# Patient Record
Sex: FEMALE | Race: WHITE | NOT HISPANIC OR LATINO | URBAN - METROPOLITAN AREA
[De-identification: names, ages, dates, MRNs, and addresses within clinical notes are randomized per-mention and may not be internally consistent; named-entity substitution may affect disease eponyms.]

---

## 2017-07-12 ENCOUNTER — TELEPHONE (OUTPATIENT)
Dept: CARDIOLOGY | Facility: CLINIC | Age: 57
End: 2017-07-12

## 2017-07-12 NOTE — TELEPHONE ENCOUNTER
----- Message from Angelita Cody sent at 7/12/2017  8:07 AM CDT -----  Contact: [phone]  Mr. Celaya with [facility] is requesting to have patient seen at the [facility] for hospital f/u chest pain

## 2017-09-11 ENCOUNTER — OFFICE VISIT (OUTPATIENT)
Dept: CARDIOLOGY | Facility: CLINIC | Age: 57
End: 2017-09-11
Payer: MEDICAID

## 2017-09-11 ENCOUNTER — HOSPITAL ENCOUNTER (INPATIENT)
Facility: HOSPITAL | Age: 57
LOS: 2 days | Discharge: HOME | DRG: 247 | End: 2017-09-13
Attending: INTERNAL MEDICINE | Admitting: INTERNAL MEDICINE
Payer: MEDICAID

## 2017-09-11 VITALS
HEART RATE: 71 BPM | OXYGEN SATURATION: 97 % | SYSTOLIC BLOOD PRESSURE: 210 MMHG | WEIGHT: 158.63 LBS | DIASTOLIC BLOOD PRESSURE: 117 MMHG

## 2017-09-11 DIAGNOSIS — Z72.0 TOBACCO ABUSE: ICD-10-CM

## 2017-09-11 DIAGNOSIS — I25.10 ATHEROSCLEROTIC HEART DISEASE OF NATIVE CORONARY ARTERY WITHOUT ANGINA PECTORIS: ICD-10-CM

## 2017-09-11 DIAGNOSIS — I20.0 UNSTABLE ANGINA: ICD-10-CM

## 2017-09-11 DIAGNOSIS — I16.0 MALIGNANT HYPERTENSIVE URGENCY: ICD-10-CM

## 2017-09-11 DIAGNOSIS — I16.0 MALIGNANT HYPERTENSIVE URGENCY: Primary | ICD-10-CM

## 2017-09-11 DIAGNOSIS — I25.10 ATHEROSCLEROSIS OF NATIVE CORONARY ARTERY OF NATIVE HEART WITHOUT ANGINA PECTORIS: ICD-10-CM

## 2017-09-11 LAB
ABO + RH BLD: NORMAL
BASOPHILS # BLD AUTO: 0.05 K/UL
BASOPHILS NFR BLD: 0.7 %
BLD GP AB SCN CELLS X3 SERPL QL: NORMAL
BNP SERPL-MCNC: 128 PG/ML
DIFFERENTIAL METHOD: NORMAL
EOSINOPHIL # BLD AUTO: 0.1 K/UL
EOSINOPHIL NFR BLD: 1.8 %
ERYTHROCYTE [DISTWIDTH] IN BLOOD BY AUTOMATED COUNT: 14.5 %
HCT VFR BLD AUTO: 44.1 %
HGB BLD-MCNC: 14.8 G/DL
LYMPHOCYTES # BLD AUTO: 3 K/UL
LYMPHOCYTES NFR BLD: 43.6 %
MCH RBC QN AUTO: 29.7 PG
MCHC RBC AUTO-ENTMCNC: 33.6 G/DL
MCV RBC AUTO: 88 FL
MONOCYTES # BLD AUTO: 0.5 K/UL
MONOCYTES NFR BLD: 6.7 %
NEUTROPHILS # BLD AUTO: 3.2 K/UL
NEUTROPHILS NFR BLD: 46.9 %
PLATELET # BLD AUTO: 254 K/UL
PMV BLD AUTO: 11 FL
RBC # BLD AUTO: 4.99 M/UL
TROPONIN I SERPL DL<=0.01 NG/ML-MCNC: 0.05 NG/ML
WBC # BLD AUTO: 6.76 K/UL

## 2017-09-11 PROCEDURE — 83880 ASSAY OF NATRIURETIC PEPTIDE: CPT

## 2017-09-11 PROCEDURE — 25000003 PHARM REV CODE 250: Performed by: INTERNAL MEDICINE

## 2017-09-11 PROCEDURE — 99205 OFFICE O/P NEW HI 60 MIN: CPT | Mod: S$GLB,,, | Performed by: INTERNAL MEDICINE

## 2017-09-11 PROCEDURE — 36415 COLL VENOUS BLD VENIPUNCTURE: CPT

## 2017-09-11 PROCEDURE — 86850 RBC ANTIBODY SCREEN: CPT

## 2017-09-11 PROCEDURE — 11000001 HC ACUTE MED/SURG PRIVATE ROOM

## 2017-09-11 PROCEDURE — 85025 COMPLETE CBC W/AUTO DIFF WBC: CPT

## 2017-09-11 PROCEDURE — 86900 BLOOD TYPING SEROLOGIC ABO: CPT

## 2017-09-11 PROCEDURE — 84484 ASSAY OF TROPONIN QUANT: CPT

## 2017-09-11 PROCEDURE — A4216 STERILE WATER/SALINE, 10 ML: HCPCS | Performed by: INTERNAL MEDICINE

## 2017-09-11 RX ORDER — LOSARTAN POTASSIUM 25 MG/1
25 TABLET ORAL DAILY
Status: ON HOLD | COMMUNITY
End: 2017-09-13

## 2017-09-11 RX ORDER — AMLODIPINE BESYLATE 5 MG/1
10 TABLET ORAL DAILY
Status: DISCONTINUED | OUTPATIENT
Start: 2017-09-12 | End: 2017-09-13 | Stop reason: HOSPADM

## 2017-09-11 RX ORDER — CLOPIDOGREL BISULFATE 75 MG/1
75 TABLET ORAL DAILY
Status: DISCONTINUED | OUTPATIENT
Start: 2017-09-12 | End: 2017-09-13 | Stop reason: HOSPADM

## 2017-09-11 RX ORDER — ATORVASTATIN CALCIUM 10 MG/1
10 TABLET, FILM COATED ORAL DAILY
COMMUNITY
End: 2017-09-25 | Stop reason: SDUPTHER

## 2017-09-11 RX ORDER — METOPROLOL TARTRATE 25 MG/1
25 TABLET, FILM COATED ORAL 2 TIMES DAILY
Status: DISCONTINUED | OUTPATIENT
Start: 2017-09-11 | End: 2017-09-13 | Stop reason: HOSPADM

## 2017-09-11 RX ORDER — FLUCONAZOLE 200 MG/1
200 TABLET ORAL DAILY
COMMUNITY

## 2017-09-11 RX ORDER — ZOLPIDEM TARTRATE 5 MG/1
5 TABLET ORAL NIGHTLY PRN
Status: DISCONTINUED | OUTPATIENT
Start: 2017-09-11 | End: 2017-09-13 | Stop reason: HOSPADM

## 2017-09-11 RX ORDER — HYDROCODONE BITARTRATE AND ACETAMINOPHEN 5; 325 MG/1; MG/1
1 TABLET ORAL EVERY 6 HOURS PRN
Status: DISCONTINUED | OUTPATIENT
Start: 2017-09-11 | End: 2017-09-13 | Stop reason: HOSPADM

## 2017-09-11 RX ORDER — LOSARTAN POTASSIUM 25 MG/1
25 TABLET ORAL DAILY
Qty: 90 TABLET | Refills: 3 | Status: CANCELLED | OUTPATIENT
Start: 2017-09-11 | End: 2018-09-11

## 2017-09-11 RX ORDER — HYDROCHLOROTHIAZIDE 25 MG/1
25 TABLET ORAL DAILY
COMMUNITY
End: 2017-12-18

## 2017-09-11 RX ORDER — HYDROCHLOROTHIAZIDE 25 MG/1
25 TABLET ORAL DAILY
Qty: 30 TABLET | Refills: 11 | Status: CANCELLED | OUTPATIENT
Start: 2017-09-11 | End: 2018-09-11

## 2017-09-11 RX ORDER — CLOPIDOGREL BISULFATE 75 MG/1
300 TABLET ORAL ONCE
Status: COMPLETED | OUTPATIENT
Start: 2017-09-11 | End: 2017-09-11

## 2017-09-11 RX ORDER — TRIAMCINOLONE ACETONIDE 0.25 MG/G
CREAM TOPICAL 2 TIMES DAILY
COMMUNITY

## 2017-09-11 RX ORDER — FLUCONAZOLE 200 MG/1
200 TABLET ORAL DAILY
Qty: 30 TABLET | Refills: 0 | Status: CANCELLED | OUTPATIENT
Start: 2017-09-11 | End: 2017-10-11

## 2017-09-11 RX ORDER — NEOMYCIN SULFATE, POLYMYXIN B SULFATE, HYDROCORTISONE 3.5; 10000; 1 MG/ML; [USP'U]/ML; MG/ML
3 SOLUTION/ DROPS AURICULAR (OTIC) 3 TIMES DAILY
COMMUNITY

## 2017-09-11 RX ORDER — ATORVASTATIN CALCIUM 40 MG/1
80 TABLET, FILM COATED ORAL DAILY
Status: DISCONTINUED | OUTPATIENT
Start: 2017-09-11 | End: 2017-09-13 | Stop reason: HOSPADM

## 2017-09-11 RX ORDER — AMLODIPINE BESYLATE 10 MG/1
10 TABLET ORAL DAILY
Qty: 30 TABLET | Refills: 11 | Status: CANCELLED | OUTPATIENT
Start: 2017-09-11 | End: 2018-09-11

## 2017-09-11 RX ORDER — ASPIRIN 81 MG/1
81 TABLET ORAL DAILY
Status: DISCONTINUED | OUTPATIENT
Start: 2017-09-12 | End: 2017-09-13 | Stop reason: HOSPADM

## 2017-09-11 RX ORDER — HYDROCHLOROTHIAZIDE 25 MG/1
25 TABLET ORAL DAILY
Status: DISCONTINUED | OUTPATIENT
Start: 2017-09-12 | End: 2017-09-13 | Stop reason: HOSPADM

## 2017-09-11 RX ORDER — NITROGLYCERIN 0.4 MG/1
0.4 TABLET SUBLINGUAL EVERY 5 MIN PRN
Status: DISCONTINUED | OUTPATIENT
Start: 2017-09-11 | End: 2017-09-13 | Stop reason: HOSPADM

## 2017-09-11 RX ORDER — LOSARTAN POTASSIUM 25 MG/1
25 TABLET ORAL DAILY
Status: DISCONTINUED | OUTPATIENT
Start: 2017-09-12 | End: 2017-09-13

## 2017-09-11 RX ORDER — ATORVASTATIN CALCIUM 20 MG/1
20 TABLET, FILM COATED ORAL DAILY
Qty: 90 TABLET | Refills: 3 | Status: CANCELLED | OUTPATIENT
Start: 2017-09-11 | End: 2018-09-11

## 2017-09-11 RX ORDER — SODIUM CHLORIDE 0.9 % (FLUSH) 0.9 %
3 SYRINGE (ML) INJECTION EVERY 8 HOURS
Status: DISCONTINUED | OUTPATIENT
Start: 2017-09-11 | End: 2017-09-13 | Stop reason: HOSPADM

## 2017-09-11 RX ORDER — AMLODIPINE BESYLATE 10 MG/1
10 TABLET ORAL DAILY
COMMUNITY
End: 2019-05-24 | Stop reason: SDUPTHER

## 2017-09-11 RX ORDER — ACETAMINOPHEN 325 MG/1
650 TABLET ORAL EVERY 4 HOURS PRN
Status: DISCONTINUED | OUTPATIENT
Start: 2017-09-11 | End: 2017-09-13 | Stop reason: HOSPADM

## 2017-09-11 RX ORDER — NITROGLYCERIN 0.4 MG/1
0.4 TABLET SUBLINGUAL EVERY 5 MIN PRN
Status: DISCONTINUED | OUTPATIENT
Start: 2017-09-11 | End: 2017-09-11

## 2017-09-11 RX ADMIN — ATORVASTATIN CALCIUM 80 MG: 40 TABLET, FILM COATED ORAL at 10:09

## 2017-09-11 RX ADMIN — METOPROLOL TARTRATE 25 MG: 25 TABLET ORAL at 10:09

## 2017-09-11 RX ADMIN — CLOPIDOGREL BISULFATE 300 MG: 75 TABLET ORAL at 10:09

## 2017-09-11 RX ADMIN — SODIUM CHLORIDE, PRESERVATIVE FREE 3 ML: 5 INJECTION INTRAVENOUS at 10:09

## 2017-09-11 NOTE — PROGRESS NOTES
Subjective:   Patient ID:  Yuval Fonseca is a 57 y.o. female who presents for evaluation of No chief complaint on file.      HPI: 58 y/o female with PMH of HTN, tobacco abuse and family history of CAD present with complaints of chest pain that have been present for 1 month. Pain is located mid sternally, pressure in quality with radiation to both arms. Pain is severe in intensity and intermittent in timing with each episode lasting 5-7 minutes. She is having associated dyspnea. + N/V and diaphoresis. She does have family history of CAD and smoked a pack a day for the last 40 years. She is not active so unable to say if pain occur with rest. Never had MI or CVA before. BP is elevated. She did not take medications this morning.     She is having new ECG changes since July.  /110    Past Medical History:   Diagnosis Date    Hypertension        History reviewed. No pertinent surgical history.    Social History   Substance Use Topics    Smoking status: Current Every Day Smoker     Packs/day: 1.00     Years: 40.00    Smokeless tobacco: Never Used    Alcohol use Not on file       History reviewed. No pertinent family history.    Patient's Medications   New Prescriptions    No medications on file   Previous Medications    AMLODIPINE (NORVASC) 10 MG TABLET    Take 10 mg by mouth once daily.    ATORVASTATIN (LIPITOR) 10 MG TABLET    Take 10 mg by mouth once daily.    HYDROCHLOROTHIAZIDE (HYDRODIURIL) 25 MG TABLET    Take 25 mg by mouth once daily.    RANITIDINE (ZANTAC) 150 MG TABLET    Take 150 mg by mouth 2 (two) times daily.   Modified Medications    No medications on file   Discontinued Medications    No medications on file        Review of patient's allergies indicates:  Allergies not on file    Review of Systems   Constitution: Negative.   HENT: Negative.    Eyes: Negative.    Cardiovascular: Positive for chest pain and dyspnea on exertion.   Respiratory: Negative.    Endocrine: Negative.     Hematologic/Lymphatic: Negative.    Skin: Negative.    Musculoskeletal: Negative.    Gastrointestinal: Negative.    Neurological: Negative.    Psychiatric/Behavioral: Negative.    Allergic/Immunologic: Negative.      Objective:   Physical Exam   Constitutional: She is oriented to person, place, and time. She appears well-developed and well-nourished. No distress.   Examination of the digits showed no clubbing or cyanosis   HENT:   Head: Normocephalic and atraumatic.   Eyes: Conjunctivae are normal. Pupils are equal, round, and reactive to light. Right eye exhibits no discharge.   Neck: Normal range of motion. Neck supple. No JVD present. No thyromegaly present.   No carotid bruits   Cardiovascular: Normal rate, regular rhythm, S1 normal, S2 normal, normal heart sounds, intact distal pulses and normal pulses.  PMI is not displaced.  Exam reveals no gallop, no friction rub and no opening snap.    No murmur heard.  Pulmonary/Chest: Effort normal and breath sounds normal. No respiratory distress. She has no wheezes. She has no rales. She exhibits no tenderness.   Abdominal: Soft. Bowel sounds are normal. She exhibits no distension and no mass. There is no tenderness. There is no guarding.   No hepatosplenomegaly   Musculoskeletal: Normal range of motion. She exhibits no edema or tenderness.   Lymphadenopathy:     She has no cervical adenopathy.   Neurological: She is alert and oriented to person, place, and time.   Skin: Skin is warm. No rash noted. She is not diaphoretic. No erythema.   Psychiatric: She has a normal mood and affect.   Nursing note and vitals reviewed.      ECGs reviewed-NSR with anterior lateral schemia  LABS reviewed      Assessment:     1. Unstable angina    2. Malignant hypertensive urgency    3. Tobacco abuse        Plan:     Patient is having UA. BP is also elevated indicative of hypertensive emergency  Will have patient admitted for transfer to [facility] for angiogram.  Will f/u 2 weeks after  d/c    Clinic time spent with this patient is 40 minutes.

## 2017-09-12 LAB
ANION GAP SERPL CALC-SCNC: 6 MMOL/L
APTT BLDCRRT: 28.2 SEC
BUN SERPL-MCNC: 14 MG/DL
CALCIUM SERPL-MCNC: 9.1 MG/DL
CHLORIDE SERPL-SCNC: 110 MMOL/L
CHOLEST SERPL-MCNC: 187 MG/DL
CHOLEST/HDLC SERPL: 3.7 {RATIO}
CK MB SERPL-MCNC: 1 NG/ML
CK MB SERPL-MCNC: 1.3 NG/ML
CK MB SERPL-MCNC: 2 NG/ML
CK MB SERPL-RTO: 1.3 %
CK MB SERPL-RTO: 1.5 %
CK MB SERPL-RTO: 2.3 %
CK SERPL-CCNC: 75 U/L
CK SERPL-CCNC: 84 U/L
CK SERPL-CCNC: 87 U/L
CO2 SERPL-SCNC: 26 MMOL/L
CORONARY STENOSIS: ABNORMAL
CORONARY STENT: YES
CREAT SERPL-MCNC: 0.7 MG/DL
DIASTOLIC DYSFUNCTION: YES
EST. GFR  (AFRICAN AMERICAN): >60 ML/MIN/1.73 M^2
EST. GFR  (NON AFRICAN AMERICAN): >60 ML/MIN/1.73 M^2
GLUCOSE SERPL-MCNC: 120 MG/DL
HDLC SERPL-MCNC: 50 MG/DL
HDLC SERPL: 26.7 %
INR PPP: 0.9
LDLC SERPL CALC-MCNC: 105.6 MG/DL
MITRAL VALVE MOBILITY: NORMAL
NONHDLC SERPL-MCNC: 137 MG/DL
POC ACTIVATED CLOTTING TIME K: 246 SEC (ref 74–137)
POTASSIUM SERPL-SCNC: 4.1 MMOL/L
PROTHROMBIN TIME: 9.9 SEC
RETIRED EF AND QEF - SEE NOTES: 60 (ref 55–65)
SAMPLE: ABNORMAL
SODIUM SERPL-SCNC: 142 MMOL/L
TRICUSPID VALVE REGURGITATION: ABNORMAL
TRIGL SERPL-MCNC: 157 MG/DL
TROPONIN I SERPL DL<=0.01 NG/ML-MCNC: 0.04 NG/ML
TROPONIN I SERPL DL<=0.01 NG/ML-MCNC: 0.08 NG/ML
TROPONIN I SERPL DL<=0.01 NG/ML-MCNC: 0.08 NG/ML
TROPONIN I SERPL DL<=0.01 NG/ML-MCNC: 0.09 NG/ML

## 2017-09-12 PROCEDURE — 36415 COLL VENOUS BLD VENIPUNCTURE: CPT

## 2017-09-12 PROCEDURE — 25500020 PHARM REV CODE 255

## 2017-09-12 PROCEDURE — 25000003 PHARM REV CODE 250: Performed by: INTERNAL MEDICINE

## 2017-09-12 PROCEDURE — A4216 STERILE WATER/SALINE, 10 ML: HCPCS | Performed by: INTERNAL MEDICINE

## 2017-09-12 PROCEDURE — 4A023N7 MEASUREMENT OF CARDIAC SAMPLING AND PRESSURE, LEFT HEART, PERCUTANEOUS APPROACH: ICD-10-PCS | Performed by: INTERNAL MEDICINE

## 2017-09-12 PROCEDURE — 99152 MOD SED SAME PHYS/QHP 5/>YRS: CPT | Mod: ,,, | Performed by: INTERNAL MEDICINE

## 2017-09-12 PROCEDURE — 93306 TTE W/DOPPLER COMPLETE: CPT

## 2017-09-12 PROCEDURE — 82553 CREATINE MB FRACTION: CPT | Mod: 91

## 2017-09-12 PROCEDURE — 99223 1ST HOSP IP/OBS HIGH 75: CPT | Mod: 57,25,, | Performed by: NURSE PRACTITIONER

## 2017-09-12 PROCEDURE — C1769 GUIDE WIRE: HCPCS

## 2017-09-12 PROCEDURE — 94761 N-INVAS EAR/PLS OXIMETRY MLT: CPT

## 2017-09-12 PROCEDURE — 027034Z DILATION OF CORONARY ARTERY, ONE ARTERY WITH DRUG-ELUTING INTRALUMINAL DEVICE, PERCUTANEOUS APPROACH: ICD-10-PCS | Performed by: INTERNAL MEDICINE

## 2017-09-12 PROCEDURE — 80048 BASIC METABOLIC PNL TOTAL CA: CPT

## 2017-09-12 PROCEDURE — 63600175 PHARM REV CODE 636 W HCPCS

## 2017-09-12 PROCEDURE — 84484 ASSAY OF TROPONIN QUANT: CPT | Mod: 91

## 2017-09-12 PROCEDURE — 25000003 PHARM REV CODE 250: Performed by: NURSE PRACTITIONER

## 2017-09-12 PROCEDURE — B215YZZ FLUOROSCOPY OF LEFT HEART USING OTHER CONTRAST: ICD-10-PCS | Performed by: INTERNAL MEDICINE

## 2017-09-12 PROCEDURE — 93005 ELECTROCARDIOGRAM TRACING: CPT

## 2017-09-12 PROCEDURE — B211YZZ FLUOROSCOPY OF MULTIPLE CORONARY ARTERIES USING OTHER CONTRAST: ICD-10-PCS | Performed by: INTERNAL MEDICINE

## 2017-09-12 PROCEDURE — 25000003 PHARM REV CODE 250

## 2017-09-12 PROCEDURE — 80061 LIPID PANEL: CPT

## 2017-09-12 PROCEDURE — 93458 L HRT ARTERY/VENTRICLE ANGIO: CPT | Mod: 26,59,, | Performed by: INTERNAL MEDICINE

## 2017-09-12 PROCEDURE — 93306 TTE W/DOPPLER COMPLETE: CPT | Mod: 26,,, | Performed by: INTERNAL MEDICINE

## 2017-09-12 PROCEDURE — 85730 THROMBOPLASTIN TIME PARTIAL: CPT

## 2017-09-12 PROCEDURE — 11000001 HC ACUTE MED/SURG PRIVATE ROOM

## 2017-09-12 PROCEDURE — 82553 CREATINE MB FRACTION: CPT

## 2017-09-12 PROCEDURE — 92928 PRQ TCAT PLMT NTRAC ST 1 LES: CPT | Mod: RC,,, | Performed by: INTERNAL MEDICINE

## 2017-09-12 PROCEDURE — S4991 NICOTINE PATCH NONLEGEND: HCPCS | Performed by: NURSE PRACTITIONER

## 2017-09-12 PROCEDURE — 85610 PROTHROMBIN TIME: CPT

## 2017-09-12 RX ORDER — SODIUM CHLORIDE 9 MG/ML
125 INJECTION, SOLUTION INTRAVENOUS CONTINUOUS
Status: ACTIVE | OUTPATIENT
Start: 2017-09-12 | End: 2017-09-12

## 2017-09-12 RX ORDER — HYDRALAZINE HYDROCHLORIDE 20 MG/ML
10 INJECTION INTRAMUSCULAR; INTRAVENOUS EVERY 6 HOURS PRN
Status: DISCONTINUED | OUTPATIENT
Start: 2017-09-12 | End: 2017-09-13 | Stop reason: HOSPADM

## 2017-09-12 RX ORDER — DIPHENHYDRAMINE HCL 50 MG
50 CAPSULE ORAL ONCE
Status: COMPLETED | OUTPATIENT
Start: 2017-09-12 | End: 2017-09-12

## 2017-09-12 RX ORDER — IBUPROFEN 200 MG
1 TABLET ORAL DAILY
Status: DISCONTINUED | OUTPATIENT
Start: 2017-09-13 | End: 2017-09-12

## 2017-09-12 RX ORDER — ACETAMINOPHEN 325 MG/1
650 TABLET ORAL EVERY 4 HOURS PRN
Status: DISCONTINUED | OUTPATIENT
Start: 2017-09-12 | End: 2017-09-13 | Stop reason: HOSPADM

## 2017-09-12 RX ORDER — SODIUM CHLORIDE 9 MG/ML
217 INJECTION, SOLUTION INTRAVENOUS CONTINUOUS
Status: ACTIVE | OUTPATIENT
Start: 2017-09-12 | End: 2017-09-12

## 2017-09-12 RX ORDER — SODIUM CHLORIDE 9 MG/ML
INJECTION, SOLUTION INTRAVENOUS CONTINUOUS
Status: DISCONTINUED | OUTPATIENT
Start: 2017-09-12 | End: 2017-09-12

## 2017-09-12 RX ORDER — IBUPROFEN 200 MG
1 TABLET ORAL DAILY
Status: DISCONTINUED | OUTPATIENT
Start: 2017-09-12 | End: 2017-09-13 | Stop reason: HOSPADM

## 2017-09-12 RX ORDER — HYDROCODONE BITARTRATE AND ACETAMINOPHEN 5; 325 MG/1; MG/1
1 TABLET ORAL EVERY 4 HOURS PRN
Status: DISCONTINUED | OUTPATIENT
Start: 2017-09-12 | End: 2017-09-13 | Stop reason: HOSPADM

## 2017-09-12 RX ADMIN — CLOPIDOGREL BISULFATE 75 MG: 75 TABLET ORAL at 09:09

## 2017-09-12 RX ADMIN — HYDROCHLOROTHIAZIDE 25 MG: 25 TABLET ORAL at 09:09

## 2017-09-12 RX ADMIN — METOPROLOL TARTRATE 25 MG: 25 TABLET ORAL at 09:09

## 2017-09-12 RX ADMIN — SODIUM CHLORIDE 3 ML/KG/HR: 0.9 INJECTION, SOLUTION INTRAVENOUS at 09:09

## 2017-09-12 RX ADMIN — NICOTINE 1 PATCH: 21 PATCH, EXTENDED RELEASE TRANSDERMAL at 06:09

## 2017-09-12 RX ADMIN — LOSARTAN POTASSIUM 25 MG: 25 TABLET ORAL at 09:09

## 2017-09-12 RX ADMIN — DIPHENHYDRAMINE HYDROCHLORIDE 50 MG: 50 CAPSULE ORAL at 09:09

## 2017-09-12 RX ADMIN — AMLODIPINE BESYLATE 10 MG: 5 TABLET ORAL at 09:09

## 2017-09-12 RX ADMIN — SODIUM CHLORIDE: 0.9 INJECTION, SOLUTION INTRAVENOUS at 05:09

## 2017-09-12 RX ADMIN — SODIUM CHLORIDE 125 ML/HR: 0.9 INJECTION, SOLUTION INTRAVENOUS at 11:09

## 2017-09-12 RX ADMIN — SODIUM CHLORIDE, PRESERVATIVE FREE 3 ML: 5 INJECTION INTRAVENOUS at 06:09

## 2017-09-12 RX ADMIN — ATORVASTATIN CALCIUM 80 MG: 40 TABLET, FILM COATED ORAL at 09:09

## 2017-09-12 RX ADMIN — SODIUM CHLORIDE, PRESERVATIVE FREE 3 ML: 5 INJECTION INTRAVENOUS at 09:09

## 2017-09-12 NOTE — H&P
[facility]  Cardiology  History and Physical     Patient Name: Yuval Fonseca  MRN: [id number]  Admission Date: 9/11/2017  Code Status: Full Code   Attending Provider: Yamilka Bacon MD   Primary Care Physician: Usha Chaparro MD  Principal Problem:<principal problem not specified>    Patient information was obtained from patient, past medical records and ER records.     Subjective:     Chief Complaint:  Chest Pain     HPI:  58 y/o female with PMH of HTN, tobacco abuse and CAD presented with complaints of chest pain that have been present for 1 month. Pain is located mid sternally, pressure in quality with radiation to both arms. Pain is severe in intensity and intermittent in timing with each episode lasting 5-7 minutes. She is having associated dyspnea. + N/V and diaphoresis. 1PPD smoker for the last 40 years. She is not active so unable to say if pain occur with rest. Reports previous C 12+ years ago and does not known results.  BP is elevated. She is having new ECG changes since July.  /110 on presentation. Unsure of current medications. Trop elevated at 0.047, 0.079, 0.082. Loaded with Plavix. Reports asa allergy but has never taken asa. NPO for [facility] today.    Past Medical History:   Diagnosis Date    Hypertension        History reviewed. No pertinent surgical history.    Review of patient's allergies indicates:   Allergen Reactions    Asa [aspirin]        No current facility-administered medications on file prior to encounter.      Current Outpatient Prescriptions on File Prior to Encounter   Medication Sig    amlodipine (NORVASC) 10 MG tablet Take 10 mg by mouth once daily.    atorvastatin (LIPITOR) 10 MG tablet Take 10 mg by mouth once daily.    fluconazole (DIFLUCAN) 200 MG Tab Take 200 mg by mouth once daily.     hydrochlorothiazide (HYDRODIURIL) 25 MG tablet Take 25 mg by mouth once daily.    neomycin-polymyxin-hydrocortisone (CORTISPORIN) otic solution 3 drops 3  (three) times daily.    triamcinolone acetonide 0.025% (KENALOG) 0.025 % cream Apply topically 2 (two) times daily.    losartan (COZAAR) 25 MG tablet Take 25 mg by mouth once daily.    ranitidine (ZANTAC) 150 MG tablet Take 150 mg by mouth 2 (two) times daily.     Family History     None        Social History Main Topics    Smoking status: Current Every Day Smoker     Packs/day: 1.00     Years: 40.00    Smokeless tobacco: Never Used    Alcohol use Not on file    Drug use: Unknown    Sexual activity: Not on file     Review of Systems   Constitution: Positive for diaphoresis.   HENT: Negative.    Eyes: Negative.    Cardiovascular: Positive for chest pain and dyspnea on exertion. Negative for irregular heartbeat, leg swelling, near-syncope, orthopnea, palpitations, paroxysmal nocturnal dyspnea and syncope.   Respiratory: Positive for shortness of breath. Negative for cough, sputum production and wheezing.    Endocrine: Negative.    Hematologic/Lymphatic: Negative.    Skin: Negative.    Musculoskeletal: Negative.    Gastrointestinal: Negative.    Genitourinary: Negative.    Neurological: Negative.    Psychiatric/Behavioral: Positive for memory loss.   Allergic/Immunologic: Negative.      Objective:     Vital Signs (Most Recent):  Temp: 96.3 °F (35.7 °C) (09/12/17 0818)  Pulse: 65 (09/12/17 0818)  Resp: 19 (09/12/17 0818)  BP: (!) 169/92 (09/12/17 0818)  SpO2: 98 % (09/12/17 0852) Vital Signs (24h Range):  Temp:  [96.3 °F (35.7 °C)-98.1 °F (36.7 °C)] 96.3 °F (35.7 °C)  Pulse:  [58-75] 65  Resp:  [18-20] 19  SpO2:  [96 %-98 %] 98 %  BP: (133-210)/() 169/92        There is no height or weight on file to calculate BMI.    SpO2: 98 %  O2 Device (Oxygen Therapy): room air      Intake/Output Summary (Last 24 hours) at 09/12/17 1009  Last data filed at 09/12/17 0535   Gross per 24 hour   Intake              575 ml   Output                0 ml   Net              575 ml       Lines/Drains/Airways     Peripheral  Intravenous Line                 Peripheral IV - Single Lumen 09/11/17 2030 Left Hand less than 1 day                Physical Exam   Constitutional: She is oriented to person, place, and time. She appears well-developed and well-nourished. No distress.   HENT:   Head: Normocephalic and atraumatic.   Eyes: Right eye exhibits no discharge. Left eye exhibits no discharge.   Neck: No JVD present.   Cardiovascular: Normal rate, regular rhythm, normal heart sounds and intact distal pulses.  Exam reveals no gallop and no friction rub.    No murmur heard.  Pulmonary/Chest: Effort normal and breath sounds normal. She has no rales.   Abdominal: Soft. Bowel sounds are normal.   Musculoskeletal: She exhibits no edema or tenderness.   Neurological: She is alert and oriented to person, place, and time.   Skin: Skin is warm and dry. She is not diaphoretic.   Psychiatric: She has a normal mood and affect. Her behavior is normal. Judgment and thought content normal.       Significant Labs:   BMP:   Recent Labs  Lab 09/12/17  0816   *      K 4.1      CO2 26   BUN 14   CREATININE 0.7   CALCIUM 9.1   , CMP   Recent Labs  Lab 09/12/17  0816      K 4.1      CO2 26   *   BUN 14   CREATININE 0.7   CALCIUM 9.1   ANIONGAP 6*   ESTGFRAFRICA >60   EGFRNONAA >60   , CBC   Recent Labs  Lab 09/11/17  2119   WBC 6.76   HGB 14.8   HCT 44.1      , INR   Recent Labs  Lab 09/12/17  0534   INR 0.9   , Lipid Panel   Recent Labs  Lab 09/12/17  0310   CHOL 187   HDL 50   LDLCALC 105.6   TRIG 157*   CHOLHDL 26.7   , Troponin   Recent Labs  Lab 09/12/17  0010 09/12/17  0310 09/12/17  0816   TROPONINI 0.079* 0.082* 0.042*    and All pertinent lab results from the last 24 hours have been reviewed.    Significant Imaging: Echocardiogram: 2D echo with color flow doppler: No results found for this or any previous visit.    Assessment and Plan:     Tobacco abuse    Complete smoking cessation recommended  Refer to  smoking cessation program as outpatient         Malignant hypertensive urgency    SBP >200 on admission (did not take her medications at home)  Improved currently in the 150s  Continue Norvasc, HCTZ, ARB, and BB  Will trend and adjust medications as needed        Unstable angina    Patient with 1 mo history of intermittent CP with radiation to BUE  Ischemic TW changes on ECG  Trop 0.047, 0.079, 0.082  Loaded with Plavix and declined asa 2/2 reported allergy but also reports never taking asa in the past  [facility] this AM  Continue high intensity statin, BB, ARB  Echo today  Further recs to follow angiogram            VTE Risk Mitigation         Ordered     Low Risk of VTE  Once      09/11/17 2048          Humberto Woo NP  Cardiology   [facility]

## 2017-09-12 NOTE — ASSESSMENT & PLAN NOTE
SBP >200 on admission (did not take her medications at home)  Improved currently in the 150s  Continue Norvasc, HCTZ, ARB, and BB  Will trend and adjust medications as needed

## 2017-09-12 NOTE — PLAN OF CARE
Problem: Patient Care Overview  Goal: Plan of Care Review  Outcome: Ongoing (interventions implemented as appropriate)  Pt VSS and NAD noted overnight. No c/o chest pain. Pt informed of Cath procedure, educational handouts provided. Pt prepped for procedure. NS infusing @ 100. Last troponin bumped up to 0.082 from 0.079. NPO past midnight. All safety precautions maintained.   Tele:  NSR, SB, 70's-50's  No ectopy noted.     Rn to continue to monitor.

## 2017-09-12 NOTE — SUBJECTIVE & OBJECTIVE
Past Medical History:   Diagnosis Date    Hypertension        History reviewed. No pertinent surgical history.    Review of patient's allergies indicates:   Allergen Reactions    Asa [aspirin]        No current facility-administered medications on file prior to encounter.      Current Outpatient Prescriptions on File Prior to Encounter   Medication Sig    amlodipine (NORVASC) 10 MG tablet Take 10 mg by mouth once daily.    atorvastatin (LIPITOR) 10 MG tablet Take 10 mg by mouth once daily.    fluconazole (DIFLUCAN) 200 MG Tab Take 200 mg by mouth once daily.     hydrochlorothiazide (HYDRODIURIL) 25 MG tablet Take 25 mg by mouth once daily.    neomycin-polymyxin-hydrocortisone (CORTISPORIN) otic solution 3 drops 3 (three) times daily.    triamcinolone acetonide 0.025% (KENALOG) 0.025 % cream Apply topically 2 (two) times daily.    losartan (COZAAR) 25 MG tablet Take 25 mg by mouth once daily.    ranitidine (ZANTAC) 150 MG tablet Take 150 mg by mouth 2 (two) times daily.     Family History     None        Social History Main Topics    Smoking status: Current Every Day Smoker     Packs/day: 1.00     Years: 40.00    Smokeless tobacco: Never Used    Alcohol use Not on file    Drug use: Unknown    Sexual activity: Not on file     Review of Systems   Constitution: Positive for diaphoresis.   HENT: Negative.    Eyes: Negative.    Cardiovascular: Positive for chest pain and dyspnea on exertion. Negative for irregular heartbeat, leg swelling, near-syncope, orthopnea, palpitations, paroxysmal nocturnal dyspnea and syncope.   Respiratory: Positive for shortness of breath. Negative for cough, sputum production and wheezing.    Endocrine: Negative.    Hematologic/Lymphatic: Negative.    Skin: Negative.    Musculoskeletal: Negative.    Gastrointestinal: Negative.    Genitourinary: Negative.    Neurological: Negative.    Psychiatric/Behavioral: Positive for memory loss.   Allergic/Immunologic: Negative.       Objective:     Vital Signs (Most Recent):  Temp: 96.3 °F (35.7 °C) (09/12/17 0818)  Pulse: 65 (09/12/17 0818)  Resp: 19 (09/12/17 0818)  BP: (!) 169/92 (09/12/17 0818)  SpO2: 98 % (09/12/17 0852) Vital Signs (24h Range):  Temp:  [96.3 °F (35.7 °C)-98.1 °F (36.7 °C)] 96.3 °F (35.7 °C)  Pulse:  [58-75] 65  Resp:  [18-20] 19  SpO2:  [96 %-98 %] 98 %  BP: (133-210)/() 169/92        There is no height or weight on file to calculate BMI.    SpO2: 98 %  O2 Device (Oxygen Therapy): room air      Intake/Output Summary (Last 24 hours) at 09/12/17 1009  Last data filed at 09/12/17 0535   Gross per 24 hour   Intake              575 ml   Output                0 ml   Net              575 ml       Lines/Drains/Airways     Peripheral Intravenous Line                 Peripheral IV - Single Lumen 09/11/17 2030 Left Hand less than 1 day                Physical Exam   Constitutional: She is oriented to person, place, and time. She appears well-developed and well-nourished. No distress.   HENT:   Head: Normocephalic and atraumatic.   Eyes: Right eye exhibits no discharge. Left eye exhibits no discharge.   Neck: No JVD present.   Cardiovascular: Normal rate, regular rhythm, normal heart sounds and intact distal pulses.  Exam reveals no gallop and no friction rub.    No murmur heard.  Pulmonary/Chest: Effort normal and breath sounds normal. She has no rales.   Abdominal: Soft. Bowel sounds are normal.   Musculoskeletal: She exhibits no edema or tenderness.   Neurological: She is alert and oriented to person, place, and time.   Skin: Skin is warm and dry. She is not diaphoretic.   Psychiatric: She has a normal mood and affect. Her behavior is normal. Judgment and thought content normal.       Significant Labs:   BMP:   Recent Labs  Lab 09/12/17 0816   *      K 4.1      CO2 26   BUN 14   CREATININE 0.7   CALCIUM 9.1   , CMP   Recent Labs  Lab 09/12/17  0816      K 4.1      CO2 26   *   BUN 14    CREATININE 0.7   CALCIUM 9.1   ANIONGAP 6*   ESTGFRAFRICA >60   EGFRNONAA >60   , CBC   Recent Labs  Lab 09/11/17  2119   WBC 6.76   HGB 14.8   HCT 44.1      , INR   Recent Labs  Lab 09/12/17  0534   INR 0.9   , Lipid Panel   Recent Labs  Lab 09/12/17  0310   CHOL 187   HDL 50   LDLCALC 105.6   TRIG 157*   CHOLHDL 26.7   , Troponin   Recent Labs  Lab 09/12/17  0010 09/12/17  0310 09/12/17  0816   TROPONINI 0.079* 0.082* 0.042*    and All pertinent lab results from the last 24 hours have been reviewed.    Significant Imaging: Echocardiogram: 2D echo with color flow doppler: No results found for this or any previous visit.

## 2017-09-12 NOTE — PLAN OF CARE
Future Appointments  Date Time Provider Department Center   9/25/2017 9:15 AM Yamilka Bacon MD [facility] CARDIO [place]        09/12/17 1342   Discharge Assessment   Assessment Type Discharge Planning Assessment   Confirmed/corrected address and phone number on facesheet? Yes   Assessment information obtained from? Patient   Communicated expected length of stay with patient/caregiver yes   Prior to hospitilization cognitive status: Alert/Oriented   Prior to hospitalization functional status: Independent   Current cognitive status: Alert/Oriented   Current Functional Status: Independent   Lives With significant other   Able to Return to Prior Arrangements yes   Is patient able to care for self after discharge? No   Patient's perception of discharge disposition home or selfcare   Readmission Within The Last 30 Days no previous admission in last 30 days   Patient currently being followed by outpatient case management? No   Patient currently receives any other outside agency services? No   Equipment Currently Used at Home none   Do you have any problems affording any of your prescribed medications? No   Is the patient taking medications as prescribed? yes   Does the patient have transportation home? No   Does the patient receive services at the [facility]? No   Discharge Plan A Home;Home with family   Discharge Plan B Home;Home with family   Patient/Family In Agreement With Plan yes     Huong Llamas, RN, CCM, CMSRN  RN Transition Navigator  [phone]

## 2017-09-12 NOTE — ASSESSMENT & PLAN NOTE
Patient with 1 mo history of intermittent CP with radiation to BUE  Ischemic TW changes on ECG  Trop 0.047, 0.079, 0.082  Loaded with Plavix and declined asa 2/2 reported allergy but also reports never taking asa in the past  [facility] this AM  Continue high intensity statin, BB, ARB  Echo today  Further recs to follow angiogram

## 2017-09-12 NOTE — HPI
56 y/o female with PMH of HTN, tobacco abuse and CAD presented with complaints of chest pain that have been present for 1 month. Pain is located mid sternally, pressure in quality with radiation to both arms. Pain is severe in intensity and intermittent in timing with each episode lasting 5-7 minutes. She is having associated dyspnea. + N/V and diaphoresis. 1PPD smoker for the last 40 years. She is not active so unable to say if pain occur with rest. Reports previous LHC 12+ years ago and does not known results.  BP is elevated. She is having new ECG changes since July.

## 2017-09-12 NOTE — HOSPITAL COURSE
9/11/2017-9/12/2017 /110 on presentation. Unsure of current medications. Trop elevated at 0.047, 0.079, 0.082. Loaded with Plavix. Reports asa allergy but has never taken asa. NPO for [facility] today.

## 2017-09-12 NOTE — NURSING
2cc air removed from right radial vasc band.  No hematoma or bleeding noted.  Will continue to monitor.

## 2017-09-12 NOTE — NURSING TRANSFER
Patient transferred to floor on tele monitor. VSS no c/o of pain.  Patient attached to tele monitor upon arrival in room.   Site reviewed with RN.  Right radial site CDI, no hematoma no bleeding w/ vasc band in place.  Bilateral radial pulses 2+.  Angeline RN in agreement with assessment.  .  All questions answered. Family at bedside.

## 2017-09-13 VITALS
HEIGHT: 65 IN | WEIGHT: 158.94 LBS | RESPIRATION RATE: 19 BRPM | DIASTOLIC BLOOD PRESSURE: 85 MMHG | HEART RATE: 60 BPM | SYSTOLIC BLOOD PRESSURE: 166 MMHG | TEMPERATURE: 98 F | OXYGEN SATURATION: 98 % | BODY MASS INDEX: 26.48 KG/M2

## 2017-09-13 DIAGNOSIS — Z72.0 TOBACCO ABUSE: Primary | ICD-10-CM

## 2017-09-13 PROBLEM — I25.10 ATHEROSCLEROTIC HEART DISEASE OF NATIVE CORONARY ARTERY WITHOUT ANGINA PECTORIS: Status: ACTIVE | Noted: 2017-09-13

## 2017-09-13 LAB
ANION GAP SERPL CALC-SCNC: 9 MMOL/L
BUN SERPL-MCNC: 14 MG/DL
CALCIUM SERPL-MCNC: 9.3 MG/DL
CHLORIDE SERPL-SCNC: 107 MMOL/L
CO2 SERPL-SCNC: 24 MMOL/L
CREAT SERPL-MCNC: 0.7 MG/DL
ERYTHROCYTE [DISTWIDTH] IN BLOOD BY AUTOMATED COUNT: 14.9 %
EST. GFR  (AFRICAN AMERICAN): >60 ML/MIN/1.73 M^2
EST. GFR  (NON AFRICAN AMERICAN): >60 ML/MIN/1.73 M^2
GLUCOSE SERPL-MCNC: 108 MG/DL
HCT VFR BLD AUTO: 46.4 %
HGB BLD-MCNC: 15.3 G/DL
MCH RBC QN AUTO: 29.3 PG
MCHC RBC AUTO-ENTMCNC: 33 G/DL
MCV RBC AUTO: 89 FL
PLATELET # BLD AUTO: 252 K/UL
PMV BLD AUTO: 11.1 FL
POTASSIUM SERPL-SCNC: 3.4 MMOL/L
RBC # BLD AUTO: 5.23 M/UL
SODIUM SERPL-SCNC: 140 MMOL/L
WBC # BLD AUTO: 9.45 K/UL

## 2017-09-13 PROCEDURE — 93005 ELECTROCARDIOGRAM TRACING: CPT

## 2017-09-13 PROCEDURE — 99238 HOSP IP/OBS DSCHRG MGMT 30/<: CPT | Mod: ,,, | Performed by: NURSE PRACTITIONER

## 2017-09-13 PROCEDURE — 94761 N-INVAS EAR/PLS OXIMETRY MLT: CPT

## 2017-09-13 PROCEDURE — A4216 STERILE WATER/SALINE, 10 ML: HCPCS | Performed by: INTERNAL MEDICINE

## 2017-09-13 PROCEDURE — 80048 BASIC METABOLIC PNL TOTAL CA: CPT

## 2017-09-13 PROCEDURE — 36415 COLL VENOUS BLD VENIPUNCTURE: CPT

## 2017-09-13 PROCEDURE — 25000003 PHARM REV CODE 250: Performed by: INTERNAL MEDICINE

## 2017-09-13 PROCEDURE — 25000003 PHARM REV CODE 250: Performed by: NURSE PRACTITIONER

## 2017-09-13 PROCEDURE — 63600175 PHARM REV CODE 636 W HCPCS: Performed by: NURSE PRACTITIONER

## 2017-09-13 PROCEDURE — 85027 COMPLETE CBC AUTOMATED: CPT

## 2017-09-13 PROCEDURE — S4991 NICOTINE PATCH NONLEGEND: HCPCS | Performed by: NURSE PRACTITIONER

## 2017-09-13 RX ORDER — LOSARTAN POTASSIUM 50 MG/1
50 TABLET ORAL DAILY
Qty: 30 TABLET | Refills: 11 | Status: SHIPPED | OUTPATIENT
Start: 2017-09-13 | End: 2017-09-25 | Stop reason: SDUPTHER

## 2017-09-13 RX ORDER — METOPROLOL SUCCINATE 25 MG/1
25 TABLET, EXTENDED RELEASE ORAL DAILY
Qty: 30 TABLET | Refills: 11 | Status: SHIPPED | OUTPATIENT
Start: 2017-09-13 | End: 2017-09-25

## 2017-09-13 RX ORDER — IBUPROFEN 200 MG
1 TABLET ORAL DAILY
Qty: 30 PATCH | Refills: 3 | Status: SHIPPED | OUTPATIENT
Start: 2017-09-14 | End: 2020-01-31

## 2017-09-13 RX ORDER — CLOPIDOGREL BISULFATE 75 MG/1
75 TABLET ORAL DAILY
Qty: 30 TABLET | Refills: 11 | Status: SHIPPED | OUTPATIENT
Start: 2017-09-14 | End: 2019-06-21 | Stop reason: SDUPTHER

## 2017-09-13 RX ORDER — ASPIRIN 81 MG/1
81 TABLET ORAL DAILY
Qty: 30 TABLET | Refills: 11 | Status: SHIPPED | OUTPATIENT
Start: 2017-09-14

## 2017-09-13 RX ORDER — LOSARTAN POTASSIUM 25 MG/1
25 TABLET ORAL ONCE
Status: COMPLETED | OUTPATIENT
Start: 2017-09-13 | End: 2017-09-13

## 2017-09-13 RX ORDER — LOSARTAN POTASSIUM 50 MG/1
50 TABLET ORAL DAILY
Status: DISCONTINUED | OUTPATIENT
Start: 2017-09-14 | End: 2017-09-13 | Stop reason: HOSPADM

## 2017-09-13 RX ORDER — NITROGLYCERIN 0.4 MG/1
0.4 TABLET SUBLINGUAL EVERY 5 MIN PRN
Qty: 25 TABLET | Refills: 11 | Status: SHIPPED | OUTPATIENT
Start: 2017-09-13 | End: 2018-09-13

## 2017-09-13 RX ADMIN — ASPIRIN 81 MG: 81 TABLET, COATED ORAL at 09:09

## 2017-09-13 RX ADMIN — SODIUM CHLORIDE, PRESERVATIVE FREE 3 ML: 5 INJECTION INTRAVENOUS at 05:09

## 2017-09-13 RX ADMIN — ATORVASTATIN CALCIUM 80 MG: 40 TABLET, FILM COATED ORAL at 09:09

## 2017-09-13 RX ADMIN — CLOPIDOGREL BISULFATE 75 MG: 75 TABLET ORAL at 09:09

## 2017-09-13 RX ADMIN — HYDRALAZINE HYDROCHLORIDE 10 MG: 20 INJECTION INTRAMUSCULAR; INTRAVENOUS at 12:09

## 2017-09-13 RX ADMIN — LOSARTAN POTASSIUM 25 MG: 25 TABLET ORAL at 11:09

## 2017-09-13 RX ADMIN — HYDROCHLOROTHIAZIDE 25 MG: 25 TABLET ORAL at 09:09

## 2017-09-13 RX ADMIN — METOPROLOL TARTRATE 25 MG: 25 TABLET ORAL at 09:09

## 2017-09-13 RX ADMIN — AMLODIPINE BESYLATE 10 MG: 5 TABLET ORAL at 09:09

## 2017-09-13 RX ADMIN — LOSARTAN POTASSIUM 25 MG: 25 TABLET ORAL at 09:09

## 2017-09-13 RX ADMIN — NICOTINE 1 PATCH: 21 PATCH, EXTENDED RELEASE TRANSDERMAL at 09:09

## 2017-09-13 NOTE — PLAN OF CARE
Problem: Patient Care Overview  Goal: Plan of Care Review  Outcome: Ongoing (interventions implemented as appropriate)  Pt VSS and NAD noted overnight. Pt previously reported dizziness resolved by morning. R Radial site c/d/i. See previous note for ordered orthostatics this morning. All safety precautions maintained.   Tele:  NSR, KODY 50-60's  No ectopy or red alarms noted.     Rn gave report to oncoming. Plan of care reviewed with patient. Patient verbalized complete understanding.

## 2017-09-13 NOTE — DISCHARGE SUMMARY
[facility]  Cardiology  Discharge Summary      Patient Name: Yuval Fonseca  MRN: [id number]  Admission Date: 9/11/2017  Hospital Length of Stay: 2 days  Discharge Date and Time: 9/13/2017  Attending Physician: Yamilka Bacon MD  Discharging Provider: DEONTE Carrera, ANP  Primary Care Physician: Usha Chaparro MD    HPI: 58 y/o female with PMH of HTN, tobacco abuse and CAD presented with complaints of chest pain that have been present for 1 month. Pain is located mid sternally, pressure in quality with radiation to both arms. Pain is severe in intensity and intermittent in timing with each episode lasting 5-7 minutes. She is having associated dyspnea. + N/V and diaphoresis. 1PPD smoker for the last 40 years. She is not active so unable to say if pain occur with rest. Reports previous [facility] 12+ years ago and does not known results.     Procedure(s) (LRB):  HEART CATH-LEFT (N/A)     Indwelling Lines/Drains at time of discharge: none      Hospital Course 911/2017 Transferred from [facility] upon advice of primary cardiology (Dr. Yamilka Bacon) with chest pain concerning for USA. BP elevated at  200/110 upon presentation to [facility] with improvement to 150s upon transfer. Transferred to [facility] for further cardiac evaluation. Initial troponin elevated at 0.047 with EKG with NSR normal and marked TWI to inferior and lateral leads. Admitted to [facility] for further evaluation. 9/12/2017 No recurrent chest pain since admission. Troponin trended up to 0.079- 0.082 with continued TWI to inferolateral leads. Loaded with Plavix and placed on daily Plavix. Reports of ASA allergy since childhood but felt not to be true allergy therefore challenged with ASA with no acute issues. Decision to proceed with [facility] given USA, mildly elevated troponin and abnormal EKG. Taken to the cath lab for [facility] via right radial access with following results:    Patent LM with moderate LAD, LCX, and PDA disease.  RCA with  "distal ulcerated 95% stenosis.  EF 55% with inferior wall hypokinesis and LVEDP 10 mmHg.  PCI of RCA with 4.0 x 26 mm Resolute SABRINA.    Tolerated procedure well and recovered on telemetry floor with no complaints of chest pain since admission and stable right radial site. 9/14/2017 Stable overnight with no recurrent chest pain or SOB. Ambulating in room and in hallway with no complaints of chest pain. BP elevated at 170/101 per machine cuff confirmed with manual /98. Continued on home antihypertensive regimen with addition of Metoprolol and up titration of Losartan with improvement of BP to 150-160. Counseled extensively throughout admission on importance of strict adherence to low salt diet along with complete smoking cessation. Patient receptive to teaching but states "I will try but know it will be hard". Referred to tobacco cessation clinic upon discharge and provided with Rx for nicotine patches. Compliance with medication was discussed with particular attention paid to importance of  DAPT for one year without interruption. The risk of abrupt stent occlusion and MI with early discontinuation was specifically stressed. Verbalized understanding and will follow up with Dr. Bacon at [place] in  2-3 weeks     Consults: none    Significant Diagnostic Studies: Cardiac Graphics: Echocardiogram:   2D echo with color flow doppler:   Results for orders placed or performed during the hospital encounter of 09/11/17   2D echo with color flow doppler   Result Value Ref Range    EF 60 55 - 65    Diastolic Dysfunction Yes (A)     Mitral Valve Mobility NORMAL     Tricuspid Valve Regurgitation TRIVIAL        Pending Diagnostic Studies:     None          Final Active Diagnoses:    Diagnosis Date Noted POA    Unstable angina [I20.0] 09/11/2017 Yes    Tobacco abuse [Z72.0] 09/11/2017 Yes    Malignant hypertensive urgency [I16.0] 09/11/2017 Yes      Problems Resolved During this Admission:    Diagnosis Date Noted Date " Resolved POA       Discharged Condition: good    Follow Up:  Follow-up Information     Schedule an appointment as soon as possible for a visit with Usha Chaparro MD.    Specialty:  Emergency Medicine  Contact information:  [address]  [facility]  [place] [address]  [phone]             Yamilka Bacon MD On 9/25/2017.    Specialty:  Cardiology  Why:  9:15am  Contact information:  [address] [place] [address]  [phone]                 Patient Instructions:   No discharge procedures on file.  Medications:  Reconciled Home Medications:   Current Discharge Medication List      START taking these medications    Details   aspirin (ECOTRIN) 81 MG EC tablet Take 1 tablet (81 mg total) by mouth once daily.  Qty: 30 tablet, Refills: 11      clopidogrel (PLAVIX) 75 mg tablet Take 1 tablet (75 mg total) by mouth once daily.  Qty: 30 tablet, Refills: 11      metoprolol succinate (TOPROL-XL) 25 MG 24 hr tablet Take 1 tablet (25 mg total) by mouth once daily.  Qty: 30 tablet, Refills: 11      nicotine (NICODERM CQ) 21 mg/24 hr Place 1 patch onto the skin once daily.  Qty: 30 patch, Refills: 3      nitroGLYCERIN (NITROSTAT) 0.4 MG SL tablet Place 1 tablet (0.4 mg total) under the tongue every 5 (five) minutes as needed for Chest pain.  Qty: 25 tablet, Refills: 11         CONTINUE these medications which have CHANGED    Details   losartan (COZAAR) 50 MG tablet Take 1 tablet (50 mg total) by mouth once daily.  Qty: 30 tablet, Refills: 11         CONTINUE these medications which have NOT CHANGED    Details   amlodipine (NORVASC) 10 MG tablet Take 10 mg by mouth once daily.      atorvastatin (LIPITOR) 10 MG tablet Take 10 mg by mouth once daily.      fluconazole (DIFLUCAN) 200 MG Tab Take 200 mg by mouth once daily.       hydrochlorothiazide (HYDRODIURIL) 25 MG tablet Take 25 mg by mouth once daily.      neomycin-polymyxin-hydrocortisone (CORTISPORIN) otic solution 3 drops 3 (three) times daily.       triamcinolone acetonide 0.025% (KENALOG) 0.025 % cream Apply topically 2 (two) times daily.      ranitidine (ZANTAC) 150 MG tablet Take 150 mg by mouth 2 (two) times daily.             Time spent on the discharge of patient: 15 minutes    DEONTE Carrera, ANP  Cardiology  [facility]

## 2017-09-13 NOTE — PLAN OF CARE
"Pt ambulated w/nurse around both nursing stations on the fourth floor.  No c/o chest pain or shortness of breath.  Pt stated "I feel good."   "

## 2017-09-13 NOTE — PLAN OF CARE
Problem: Patient Care Overview  Goal: Plan of Care Review  Outcome: Ongoing (interventions implemented as appropriate)  Orthostatics:      Lyin/86  HR 61      Sittin/95  HR 74      Standin/94  HR 79

## 2017-09-13 NOTE — PLAN OF CARE
Future Appointments  Date Time Provider Department Center   9/25/2017 9:15 AM Yamilka Bacon MD [facility] CARDIO [place]        09/13/17 1513   Final Note   Assessment Type Final Discharge Note   Discharge Disposition Home   Hospital Follow Up  Appt(s) scheduled? Yes   Discharge plans and expectations educations in teach back method with documentation complete? Yes   Right Care Referral Info   Post Acute Recommendation No Care     Huong Llamas, RN, CCM, CMSRN  RN Transition Navigator  [phone]

## 2017-09-13 NOTE — PLAN OF CARE
"Pt does not have discharge orders from MD at this time; however, pt has taken off her telemetry box and stated "She is ready to leave cause the doctor said she could go home between 12:00 and 2:00 today."    "

## 2017-09-13 NOTE — PLAN OF CARE
Patient discharge instructions given and reviewed. Med rec reviewed with Patient. Patient verbalized understanding. Education provided on new medication and diagnosis and follow-up appointment. PIV d/zack tip intact. Pt tolerated well.  Awaiting transportation home.

## 2017-09-13 NOTE — PROGRESS NOTES
.Pharmacy New Medication Education    Patient accepted medication education.    Pharmacy educated patient on name and purpose of medications and possible side effects, using the teach-back method.     Apap  Amlodipine  Asa  Lipitor  Clopidogrel  Hydralazine  Hctz  Norco  Losartan  Metoprolol  Nicotine  Ntg  zolpidem    Learners of pharmacy medication education included:  patient    Patient +/- learner response:  teachback

## 2017-09-14 ENCOUNTER — PATIENT OUTREACH (OUTPATIENT)
Dept: ADMINISTRATIVE | Facility: CLINIC | Age: 57
End: 2017-09-14

## 2017-09-14 NOTE — PHYSICIAN QUERY
PT Name: Yuval Fonseca  MR #: [id number]     Physician Query Form - Medication-Correlation for Diagnosis      CDS/: Concha Hyde RN, CCDS             Contact information: [email or web address]    This form is a permanent document in the medical record.     Query Date: September 14, 2017      By submitting this query, we are merely seeking further clarification of documentation.  Please utilize your independent clinical judgment when addressing the question(s) below.    The medical record contains the following:  The patient has an order for the following medication(s):    Atorvastatin 80 mg po daily- mar 9/11--9/13    Home med   atorvastatin (LIPITOR) 10 MG tablet Take 10 mg by mouth once daily.          Please provider the corresponding diagnosis or diagnoses that support(s) the use of the medication(s)   Physician Use Only          Coronary artery disease

## 2017-09-14 NOTE — PATIENT INSTRUCTIONS
Discharge Instructions for Heart Attack  You have had a heart attack (acute myocardial infarction). A heart attack occurs when a vessel that sends blood to your heart suddenly becomes blocked. This causes your heart not to work as well as it should. Follow these guidelines for home care and lifestyle changes.  Home care  · Take your medicines exactly as directed. Dont skip doses. Talk with your healthcare provider if your medicines aren't working for you. Together you can come up with another treatment plan.  · Remember that recovery after a heart attack takes time. Plan to rest for at least 4 to 8 weeks while you recover. Then return to normal activity when your doctor says its OK.  · Ask your doctor about joining a heart rehabilitation program. This can help strengthen your heart and lungs and give you more energy and confidence.  · Tell your doctor if you are feeling depressed. Feelings of sadness are common after a heart attack. But it is important to speak to someone or seek counseling if you are feeling overwhelmed by these feelings.  · Call 911 right away if you have chest pain or pain that goes to your shoulder, neck, or back. Don't drive yourself to the hospital.  · Ask your family members to learn CPR. This is an important skill that can save lives when it's needed.  · Learn to take your own blood pressure and pulse. Keep a record of your results. Ask your doctor when you should seek emergency medical attention. He or she will tell you which blood pressure reading is dangerous.  Lifestyle changes  Your heart attack might have been caused by cardiovascular disease. Your healthcare provider will work with you to make changes to your lifestyle. This will help the heart disease from getting worse. These changes will most likely be a combination of diet and exercise.  Diet  Your healthcare provider will tell you what changes you need to make to your diet. You may need to see a registered dietitian for help  with these diet changes. These changes may include:  · Cutting back on how much fat and cholesterol you eat  · Cutting back on how much salt (sodium) you eat, especially if you have high blood pressure  · Eating more fresh vegetables and fruits  · Eating lean proteins such as fish, poultry, beans, and peas, and eating less red meat and processed meats  · Using low-fat dairy products  · Using vegetable and nut oils in limited amounts  · Limiting how many sweets and processed foods such as chips, cookies, and baked goods you eat  · Limiting how often you eat out. And when you do eat out, making better food choices.  · Not eating fried or greasy foods, or foods high in saturated fat  Exercise  Your healthcare provider may tell you to get more exercise if you haven't been physically active. Depending on your case, your provider may recommend that you get moderate to vigorous physical activity for at least 40 minutes each day, and for at least 3 to 4 days each week. A few examples of moderate to vigorous activity include:  · Walking at a brisk pace, about 3 to 4 miles per hour  · Jogging or running  · Swimming or water aerobics  · Hiking  · Dancing  · Martial arts  · Tennis  · Riding a bicycle or stationary bike  Other changes  Your healthcare provider may also recommend that you:  · Lose weight. If you are overweight or obese, your provider will work with you to lose extra pounds. Making diet changes and getting more exercise can help. A good goal is to lose your 10% of your body weight in one year.  · Stop smoking. Sign up for a stop-smoking program to make it more likely for you to quit for good. You can join a stop-smoking support group. Or ask your doctor about nicotine replacement products.  · Learn to manage stress. Stress management techniques to help you deal with stress in your home and work life. This will help you feel better emotionally and ease the strain on your heart.  Follow-up  Make a follow-up  appointment as directed by our staff.     When to seek medical advice  Call 911 right away if you have:  · Chest pain that goes to your neck, jaw, back, or shoulder  · Shortness of breath  Otherwise, call your doctor immediately if you have:  · Lightheadedness, dizziness, or fainting  · Feeling of irregular heartbeat or fast pulse.   Date Last Reviewed: 10/1/2017  © 4520-9027 [employer]. [address] [place] [address], [place] [address]. All rights reserved. This information is not intended as a substitute for professional medical care. Always follow your healthcare professional's instructions.

## 2017-09-14 NOTE — PROGRESS NOTES
C3 nurse attempted to contact patient. No answer. The following message was left for the patient to return the call:  Good morning I am a nurse calling on behalf of [employer] from the [facility].  This is a Transitional Care Call for Yuval Fonseca. When you have a moment please contact us at [phone] or [phone] Monday through Friday, between the hours of 8 am to 4 pm. We look forward to speaking with you. On behalf of [email or web address] [email or web address] have a nice day.    The patient does not have a scheduled HOSFU appointment within 7-14 days post hospital discharge date 9/13/17.

## 2017-09-25 ENCOUNTER — OFFICE VISIT (OUTPATIENT)
Dept: CARDIOLOGY | Facility: CLINIC | Age: 57
End: 2017-09-25
Payer: MEDICAID

## 2017-09-25 VITALS — OXYGEN SATURATION: 98 % | HEART RATE: 66 BPM | HEIGHT: 63 IN | BODY MASS INDEX: 28.05 KG/M2 | WEIGHT: 158.31 LBS

## 2017-09-25 DIAGNOSIS — Z72.0 TOBACCO ABUSE: ICD-10-CM

## 2017-09-25 DIAGNOSIS — Z95.5 S/P PRIMARY ANGIOPLASTY WITH CORONARY STENT: ICD-10-CM

## 2017-09-25 DIAGNOSIS — I25.10 CORONARY ARTERY DISEASE INVOLVING NATIVE CORONARY ARTERY OF NATIVE HEART WITHOUT ANGINA PECTORIS: ICD-10-CM

## 2017-09-25 DIAGNOSIS — I16.0 MALIGNANT HYPERTENSIVE URGENCY: Primary | ICD-10-CM

## 2017-09-25 PROCEDURE — 3008F BODY MASS INDEX DOCD: CPT | Mod: S$GLB,,, | Performed by: INTERNAL MEDICINE

## 2017-09-25 PROCEDURE — 99214 OFFICE O/P EST MOD 30 MIN: CPT | Mod: S$GLB,,, | Performed by: INTERNAL MEDICINE

## 2017-09-25 RX ORDER — LOSARTAN POTASSIUM 100 MG/1
100 TABLET ORAL DAILY
Qty: 30 TABLET | Refills: 5 | Status: SHIPPED | OUTPATIENT
Start: 2017-09-25 | End: 2019-05-24 | Stop reason: SDUPTHER

## 2017-09-25 RX ORDER — CARVEDILOL 25 MG/1
25 TABLET ORAL 2 TIMES DAILY WITH MEALS
Qty: 60 TABLET | Refills: 11 | Status: SHIPPED | OUTPATIENT
Start: 2017-09-25 | End: 2019-05-24 | Stop reason: SDUPTHER

## 2017-09-25 RX ORDER — ATORVASTATIN CALCIUM 80 MG/1
80 TABLET, FILM COATED ORAL DAILY
Qty: 30 TABLET | Refills: 5 | Status: SHIPPED | OUTPATIENT
Start: 2017-09-25

## 2017-09-25 NOTE — PROGRESS NOTES
Subjective:   Patient ID:  Yuval Fonseca is a 57 y.o. female who presents for follow-up of Follow-up      Problem List Items Addressed This Visit        Cardiac/Vascular    Malignant hypertensive urgency - Primary    S/P primary angioplasty with coronary stent    Coronary artery disease involving native coronary artery of native heart without angina pectoris       Other    Tobacco abuse      Other Visit Diagnoses    None.         HPI: Patient is here for f/u after UA. LHC showed RCA lesion that was treated with stent. No chest pain or dyspnea. She continues to smoke. She is having some rash on abdomin that she thinks is from one of her medications. She said she have cuted back smoking from 1 pck daily to half a pack. BP is elevated. Patient said she is complaint with medications. She denies eating excessive salt. No etoh use.     Review of Systems   Constitution: Negative.   HENT: Negative.    Eyes: Negative.    Cardiovascular: Negative.    Respiratory: Negative.    Endocrine: Negative.    Hematologic/Lymphatic: Negative.    Skin: Negative.    Musculoskeletal: Negative.    Gastrointestinal: Negative.    Neurological: Negative.    Psychiatric/Behavioral: Negative.        Patient's Medications   New Prescriptions    No medications on file   Previous Medications    AMLODIPINE (NORVASC) 10 MG TABLET    Take 10 mg by mouth once daily.    ASPIRIN (ECOTRIN) 81 MG EC TABLET    Take 1 tablet (81 mg total) by mouth once daily.    ATORVASTATIN (LIPITOR) 10 MG TABLET    Take 10 mg by mouth once daily.    CLOPIDOGREL (PLAVIX) 75 MG TABLET    Take 1 tablet (75 mg total) by mouth once daily.    FLUCONAZOLE (DIFLUCAN) 200 MG TAB    Take 200 mg by mouth once daily.     HYDROCHLOROTHIAZIDE (HYDRODIURIL) 25 MG TABLET    Take 25 mg by mouth once daily.    LOSARTAN (COZAAR) 50 MG TABLET    Take 1 tablet (50 mg total) by mouth once daily.    METOPROLOL SUCCINATE (TOPROL-XL) 25 MG 24 HR TABLET    Take 1 tablet (25 mg total) by mouth  once daily.    NEOMYCIN-POLYMYXIN-HYDROCORTISONE (CORTISPORIN) OTIC SOLUTION    3 drops 3 (three) times daily.    NICOTINE (NICODERM CQ) 21 MG/24 HR    Place 1 patch onto the skin once daily.    NITROGLYCERIN (NITROSTAT) 0.4 MG SL TABLET    Place 1 tablet (0.4 mg total) under the tongue every 5 (five) minutes as needed for Chest pain.    RANITIDINE (ZANTAC) 150 MG TABLET    Take 150 mg by mouth 2 (two) times daily.    TRIAMCINOLONE ACETONIDE 0.025% (KENALOG) 0.025 % CREAM    Apply topically 2 (two) times daily.   Modified Medications    No medications on file   Discontinued Medications    No medications on file       Objective:   Physical Exam   Constitutional: She is oriented to person, place, and time. She appears well-developed and well-nourished. No distress.   Examination of the digits showed no clubbing or cyanosis   HENT:   Head: Normocephalic and atraumatic.   Eyes: Conjunctivae are normal. Pupils are equal, round, and reactive to light. Right eye exhibits no discharge.   Neck: Normal range of motion. Neck supple. No JVD present. No thyromegaly present.   No carotid bruits   Cardiovascular: Normal rate, regular rhythm, S1 normal, S2 normal, normal heart sounds, intact distal pulses and normal pulses.  PMI is not displaced.  Exam reveals no gallop, no friction rub and no opening snap.    No murmur heard.  Pulmonary/Chest: Effort normal and breath sounds normal. No respiratory distress. She has no wheezes. She has no rales. She exhibits no tenderness.   Abdominal: Soft. Bowel sounds are normal. She exhibits no distension and no mass. There is no tenderness. There is no guarding.   No hepatosplenomegaly   Musculoskeletal: Normal range of motion. She exhibits no edema or tenderness.   Lymphadenopathy:     She has no cervical adenopathy.   Neurological: She is alert and oriented to person, place, and time.   Skin: Skin is warm. No rash noted. She is not diaphoretic. No erythema.   Psychiatric: She has a normal  mood and affect.   Nursing note and vitals reviewed.      ECGs reviewed-NSR with T wave inversion in anterior and lateral leads  LABS reviewed  Imaging including Echoes reviewed- normal ef with DD  Angiographic Results     Diagnostic:          Patient has a right dominant coronary artery.        - Left Main Coronary Artery:             The LM is normal. There is SHIRLEY 3 flow.     - Left Anterior Descending Artery:             The proximal LAD has a 40% stenosis. There is SHIRLEY 3 flow. The remaining portion of the vessel has luminal irregularities (10).     - D1:             The D1 has luminal irregularities. There is SHIRLEY 3 flow.     - Septal:             The septal has luminal irregularities. There is SHIRLEY 3 flow.     - Left Circumflex Artery:             The mid LCX has a 40% stenosis. There is SHIRLEY 3 flow. The remaining portion of the vessel has luminal irregularities (10).     - Ramus:             The ramus has luminal irregularities. There is SHIRLEY 3 flow.     - Right Coronary Artery:             The proximal RCA has a 30% stenosis. There is SHIRLEY 3 flow.             The distal RCA has a 95% stenosis. There is SHIRLEY 3 flow.                     Lesion Details:   The lesion is ulcerated.     - Posterior Descending Artery:             The ostial PDA has a 30% stenosis. There is SHIRLEY 3 flow. The remaining portion of the vessel has luminal irregularities (10).     - Posterior Lateral Branch:             The PLB has luminal irregularities. There is SHIRLEY 3 flow.      Intervention          Distal RCA:              The lesion was successfully intervened. Post-stenosis of 0% and post-SHIRLEY 3 flow. The vessel was accessed natively.  The following items were used: Blln Trek Rx 2.5 X 15 and Stent Resolute Rx 4.0 X 26 (SABRINA).    Assessment:     1. Malignant hypertensive urgency    2. S/P primary angioplasty with coronary stent    3. Tobacco abuse    4. Coronary artery disease involving native coronary artery of native heart without  angina pectoris        Plan:     Stop metoprolol  Start coreg 25 mg po bid  Increase losartan to 100 mg po daily  Continue norvasc 10 mg and HCTZ 25 mg po daily.  Low salt diet  Smoking cessation  Increase Activity  BP log to review on next visit    F/u in 4  Weeks    Clinic time spent with this patient is 20 minutes.

## 2017-09-28 NOTE — PHYSICIAN QUERY
PT Name: Yuval Fonseca  MR #: [id number]     Physician Query Form - Diagnosis Clarification      CDS/: Concha Hyde RN, CCDS             Contact information: [email or web address]    This form is a permanent document in the medical record.     Query Date: September 28, 2017    By submitting this query, we are merely seeking further clarification of documentation.  Please utilize your independent clinical judgment when addressing the question(s) below.     The medical record contains the following:      Findings Supporting Clinical Information Location in Medical Record   PMH of HTN, tobacco abuse and CAD presented with complaints of chest pain    Unstable Angina  Ischemic TW changes on ECG        referred for catheterization by Dr. Yamilka Bacon for ACS (NSTEMI).     PCI of RCA with 4.0 x 26 mm Resolute SABRINA.    Unstable Angina                Trop 0.79-->0.082-->0.042-->0.094 9/12 h/p              9/12lab      9/12 cath note               D/c summary 9/13         Please clarify if the _x__NSTEMI_____ diagnosis has been:    [ x ] Ruled In  [  ] Ruled Out  [  ] Clinically insignificant  [  ] Clinically undetermined  [  ] Other/Clarification of findings (please specify)_______________________________    Please document in your progress notes daily for the duration of treatment, until resolved, and include in your discharge summary.

## 2017-10-20 ENCOUNTER — OFFICE VISIT (OUTPATIENT)
Dept: CARDIOLOGY | Facility: CLINIC | Age: 57
End: 2017-10-20
Payer: MEDICAID

## 2017-10-20 VITALS
HEART RATE: 63 BPM | OXYGEN SATURATION: 97 % | SYSTOLIC BLOOD PRESSURE: 210 MMHG | BODY MASS INDEX: 28 KG/M2 | DIASTOLIC BLOOD PRESSURE: 101 MMHG | HEIGHT: 63 IN | WEIGHT: 158 LBS

## 2017-10-20 DIAGNOSIS — I16.0 MALIGNANT HYPERTENSIVE URGENCY: Primary | ICD-10-CM

## 2017-10-20 DIAGNOSIS — I25.10 ATHEROSCLEROSIS OF NATIVE CORONARY ARTERY OF NATIVE HEART WITHOUT ANGINA PECTORIS: ICD-10-CM

## 2017-10-20 DIAGNOSIS — Z95.5 S/P PRIMARY ANGIOPLASTY WITH CORONARY STENT: ICD-10-CM

## 2017-10-20 DIAGNOSIS — Z72.0 TOBACCO ABUSE: ICD-10-CM

## 2017-10-20 PROCEDURE — 99215 OFFICE O/P EST HI 40 MIN: CPT | Mod: S$GLB,,, | Performed by: INTERNAL MEDICINE

## 2017-10-20 RX ORDER — ALPRAZOLAM 1 MG/1
TABLET ORAL
Refills: 0 | COMMUNITY
Start: 2017-08-30

## 2017-10-20 NOTE — PROGRESS NOTES
Subjective:   Patient ID:  Yuval Fonseca is a 57 y.o. female who presents for follow-up of Follow-up      Problem List Items Addressed This Visit        Cardiac/Vascular    Malignant hypertensive urgency - Primary    Atherosclerotic heart disease of native coronary artery without angina pectoris    S/P primary angioplasty with coronary stent       Other    Tobacco abuse          HPI: Patient is here for f/u of BP. On previous visit coreg 25 mg po bid was started and losartan was increased to 100 mg po daily. She said she is compliant with medication and take medications every day. BP log reviewed and BP average > 200/100s. She denies any neurological symptoms such as weakness, slurred speech or vision changes.       Patient has history of UA. LHC showed RCA lesion that was treated with stent. No chest pain or dyspnea. She continues to smoke.    She denies eating excessive salt. No etoh use.     Review of Systems   Constitution: Negative.   HENT: Negative.    Eyes: Negative.    Cardiovascular: Negative.    Respiratory: Negative.    Endocrine: Negative.    Hematologic/Lymphatic: Negative.    Skin: Negative.    Musculoskeletal: Negative.    Gastrointestinal: Negative.    Neurological: Negative.    Psychiatric/Behavioral: Negative.        Patient's Medications   New Prescriptions    No medications on file   Previous Medications    ALPRAZOLAM (XANAX) 1 MG TABLET    TAKE ONE TABLET BY MOUTH EVERY DAY FOR THREE DAYS    AMLODIPINE (NORVASC) 10 MG TABLET    Take 10 mg by mouth once daily.    ASPIRIN (ECOTRIN) 81 MG EC TABLET    Take 1 tablet (81 mg total) by mouth once daily.    ATORVASTATIN (LIPITOR) 80 MG TABLET    Take 1 tablet (80 mg total) by mouth once daily.    CARVEDILOL (COREG) 25 MG TABLET    Take 1 tablet (25 mg total) by mouth 2 (two) times daily with meals.    CLOPIDOGREL (PLAVIX) 75 MG TABLET    Take 1 tablet (75 mg total) by mouth once daily.    FLUCONAZOLE (DIFLUCAN) 200 MG TAB    Take 200 mg by mouth once  daily.     HYDROCHLOROTHIAZIDE (HYDRODIURIL) 25 MG TABLET    Take 25 mg by mouth once daily.    LOSARTAN (COZAAR) 100 MG TABLET    Take 1 tablet (100 mg total) by mouth once daily.    NEOMYCIN-POLYMYXIN-HYDROCORTISONE (CORTISPORIN) OTIC SOLUTION    3 drops 3 (three) times daily.    NICOTINE (NICODERM CQ) 21 MG/24 HR    Place 1 patch onto the skin once daily.    NITROGLYCERIN (NITROSTAT) 0.4 MG SL TABLET    Place 1 tablet (0.4 mg total) under the tongue every 5 (five) minutes as needed for Chest pain.    RANITIDINE (ZANTAC) 150 MG TABLET    Take 150 mg by mouth 2 (two) times daily.    TRIAMCINOLONE ACETONIDE 0.025% (KENALOG) 0.025 % CREAM    Apply topically 2 (two) times daily.   Modified Medications    No medications on file   Discontinued Medications    No medications on file       Objective:   Physical Exam   Constitutional: She is oriented to person, place, and time. She appears well-developed and well-nourished. No distress.   Examination of the digits showed no clubbing or cyanosis   HENT:   Head: Normocephalic and atraumatic.   Eyes: Conjunctivae are normal. Pupils are equal, round, and reactive to light. Right eye exhibits no discharge.   Neck: Normal range of motion. Neck supple. No JVD present. No thyromegaly present.   No carotid bruits   Cardiovascular: Normal rate, regular rhythm, S1 normal, S2 normal, normal heart sounds, intact distal pulses and normal pulses.  PMI is not displaced.  Exam reveals no gallop, no friction rub and no opening snap.    No murmur heard.  Pulmonary/Chest: Effort normal and breath sounds normal. No respiratory distress. She has no wheezes. She has no rales. She exhibits no tenderness.   Abdominal: Soft. Bowel sounds are normal. She exhibits no distension and no mass. There is no tenderness. There is no guarding.   No hepatosplenomegaly   Musculoskeletal: Normal range of motion. She exhibits no edema or tenderness.   Lymphadenopathy:     She has no cervical adenopathy.    Neurological: She is alert and oriented to person, place, and time.   Skin: Skin is warm. No rash noted. She is not diaphoretic. No erythema.   Psychiatric: She has a normal mood and affect.   Nursing note and vitals reviewed.      ECGs reviewed-NSR with T wave inversion in anterior and lateral leads  LABS reviewed  Imaging including Echoes reviewed- normal ef with DD  Angiographic Results     Diagnostic:          Patient has a right dominant coronary artery.        - Left Main Coronary Artery:             The LM is normal. There is SHIRLEY 3 flow.     - Left Anterior Descending Artery:             The proximal LAD has a 40% stenosis. There is SHIRLEY 3 flow. The remaining portion of the vessel has luminal irregularities (10).     - D1:             The D1 has luminal irregularities. There is SHIRLEY 3 flow.     - Septal:             The septal has luminal irregularities. There is SHIRLEY 3 flow.     - Left Circumflex Artery:             The mid LCX has a 40% stenosis. There is SHIRLEY 3 flow. The remaining portion of the vessel has luminal irregularities (10).     - Ramus:             The ramus has luminal irregularities. There is SHIRLEY 3 flow.     - Right Coronary Artery:             The proximal RCA has a 30% stenosis. There is SHIRLEY 3 flow.             The distal RCA has a 95% stenosis. There is SHIRLEY 3 flow.                     Lesion Details:   The lesion is ulcerated.     - Posterior Descending Artery:             The ostial PDA has a 30% stenosis. There is SHIRLEY 3 flow. The remaining portion of the vessel has luminal irregularities (10).     - Posterior Lateral Branch:             The PLB has luminal irregularities. There is SHIRLEY 3 flow.      Intervention          Distal RCA:              The lesion was successfully intervened. Post-stenosis of 0% and post-SHIRLEY 3 flow. The vessel was accessed natively.  The following items were used: Blln Trek Rx 2.5 X 15 and Stent Resolute Rx 4.0 X 26 ([facility]).    Assessment:     1. Malignant  hypertensive urgency    2. Atherosclerosis of native coronary artery of native heart without angina pectoris    3. S/P primary angioplasty with coronary stent    4. Tobacco abuse        Plan:     Will have patient admitted for HTN urgency and IV drugs to control BP. Will recommend Nitroprusside and Renal US.   May need minoxidil, clonidine or aldactone on discharge.    Low salt diet  Smoking cessation  Increase Activity    F/u in 4  Weeks    Clinic time spent with this patient is 20 minutes.

## 2017-11-09 RX ORDER — LOSARTAN POTASSIUM 50 MG/1
TABLET ORAL
Qty: 30 TABLET | Refills: 6 | Status: SHIPPED | OUTPATIENT
Start: 2017-11-09 | End: 2017-12-01

## 2017-12-01 ENCOUNTER — OFFICE VISIT (OUTPATIENT)
Dept: CARDIOLOGY | Facility: CLINIC | Age: 57
End: 2017-12-01
Payer: MEDICAID

## 2017-12-01 VITALS
HEART RATE: 77 BPM | BODY MASS INDEX: 25.99 KG/M2 | DIASTOLIC BLOOD PRESSURE: 98 MMHG | WEIGHT: 156 LBS | HEIGHT: 65 IN | SYSTOLIC BLOOD PRESSURE: 220 MMHG

## 2017-12-01 DIAGNOSIS — Z72.0 TOBACCO ABUSE: ICD-10-CM

## 2017-12-01 DIAGNOSIS — I25.10 ATHEROSCLEROSIS OF NATIVE CORONARY ARTERY OF NATIVE HEART WITHOUT ANGINA PECTORIS: Primary | ICD-10-CM

## 2017-12-01 DIAGNOSIS — I16.0 HYPERTENSIVE URGENCY: ICD-10-CM

## 2017-12-01 DIAGNOSIS — Z95.5 S/P PRIMARY ANGIOPLASTY WITH CORONARY STENT: ICD-10-CM

## 2017-12-01 PROCEDURE — 99214 OFFICE O/P EST MOD 30 MIN: CPT | Mod: S$GLB,,, | Performed by: INTERNAL MEDICINE

## 2017-12-01 RX ORDER — MINOXIDIL 10 MG/1
10 TABLET ORAL DAILY
Qty: 30 TABLET | Refills: 11 | Status: SHIPPED | OUTPATIENT
Start: 2017-12-01 | End: 2019-06-21

## 2017-12-01 NOTE — PROGRESS NOTES
Subjective:   Patient ID:  Yuval Fonseca is a 57 y.o. female who presents for follow-up of No chief complaint on file.      Problem List Items Addressed This Visit        Cardiac/Vascular    Atherosclerotic heart disease of native coronary artery without angina pectoris - Primary    S/P primary angioplasty with coronary stent    Hypertensive urgency       Other    Tobacco abuse          HPI: Patient is here for f/u of BP. On previous visit she was sent to ER for HTN urgency but was never admitted. She is still on same medications which include HCTZ 25 mg daily, norvasc 10 mg po daily, losartan 100 mg po daily and coreg 25 mg po bid. She said she is compliant with medication and take medications every day. BP log reviewed and BP average > 200/100s. She denies any neurological symptoms such as weakness, slurred speech or vision changes.       Patient has history of UA. LHC showed RCA lesion that was treated with stent. No chest pain or dyspnea. She continues to smoke.    She denies eating excessive salt. No etoh use.     Concerns for non compliance. Patient unable to say what medications she is on when they are taken.     Review of Systems   Constitution: Negative.   HENT: Negative.    Eyes: Negative.    Cardiovascular: Negative.    Respiratory: Negative.    Endocrine: Negative.    Hematologic/Lymphatic: Negative.    Skin: Negative.    Musculoskeletal: Negative.    Gastrointestinal: Negative.    Neurological: Negative.    Psychiatric/Behavioral: Negative.        Patient's Medications   New Prescriptions    No medications on file   Previous Medications    ALPRAZOLAM (XANAX) 1 MG TABLET    TAKE ONE TABLET BY MOUTH EVERY DAY FOR THREE DAYS    AMLODIPINE (NORVASC) 10 MG TABLET    Take 10 mg by mouth once daily.    ASPIRIN (ECOTRIN) 81 MG EC TABLET    Take 1 tablet (81 mg total) by mouth once daily.    ATORVASTATIN (LIPITOR) 80 MG TABLET    Take 1 tablet (80 mg total) by mouth once daily.    CARVEDILOL (COREG) 25 MG  TABLET    Take 1 tablet (25 mg total) by mouth 2 (two) times daily with meals.    CLOPIDOGREL (PLAVIX) 75 MG TABLET    Take 1 tablet (75 mg total) by mouth once daily.    FLUCONAZOLE (DIFLUCAN) 200 MG TAB    Take 200 mg by mouth once daily.     HYDROCHLOROTHIAZIDE (HYDRODIURIL) 25 MG TABLET    Take 25 mg by mouth once daily.    LOSARTAN (COZAAR) 100 MG TABLET    Take 1 tablet (100 mg total) by mouth once daily.    LOSARTAN (COZAAR) 50 MG TABLET    TAKE ONE TABLET BY MOUTH ONCE DAILY    NEOMYCIN-POLYMYXIN-HYDROCORTISONE (CORTISPORIN) OTIC SOLUTION    3 drops 3 (three) times daily.    NICOTINE (NICODERM CQ) 21 MG/24 HR    Place 1 patch onto the skin once daily.    NITROGLYCERIN (NITROSTAT) 0.4 MG SL TABLET    Place 1 tablet (0.4 mg total) under the tongue every 5 (five) minutes as needed for Chest pain.    RANITIDINE (ZANTAC) 150 MG TABLET    Take 150 mg by mouth 2 (two) times daily.    TRIAMCINOLONE ACETONIDE 0.025% (KENALOG) 0.025 % CREAM    Apply topically 2 (two) times daily.   Modified Medications    No medications on file   Discontinued Medications    No medications on file       Objective:   Physical Exam   Constitutional: She is oriented to person, place, and time. She appears well-developed and well-nourished. No distress.   Examination of the digits showed no clubbing or cyanosis   HENT:   Head: Normocephalic and atraumatic.   Eyes: Conjunctivae are normal. Pupils are equal, round, and reactive to light. Right eye exhibits no discharge.   Neck: Normal range of motion. Neck supple. No JVD present. No thyromegaly present.   No carotid bruits   Cardiovascular: Normal rate, regular rhythm, S1 normal, S2 normal, normal heart sounds, intact distal pulses and normal pulses.  PMI is not displaced.  Exam reveals no gallop, no friction rub and no opening snap.    No murmur heard.  Pulmonary/Chest: Effort normal and breath sounds normal. No respiratory distress. She has no wheezes. She has no rales. She exhibits no  tenderness.   Abdominal: Soft. Bowel sounds are normal. She exhibits no distension and no mass. There is no tenderness. There is no guarding.   No hepatosplenomegaly   Musculoskeletal: Normal range of motion. She exhibits no edema or tenderness.   Lymphadenopathy:     She has no cervical adenopathy.   Neurological: She is alert and oriented to person, place, and time.   Skin: Skin is warm. No rash noted. She is not diaphoretic. No erythema.   Psychiatric: She has a normal mood and affect.   Nursing note and vitals reviewed.      ECGs reviewed-NSR with T wave inversion in anterior and lateral leads  LABS reviewed  Imaging including Echoes reviewed- normal ef with DD  Angiographic Results     Diagnostic:          Patient has a right dominant coronary artery.        - Left Main Coronary Artery:             The LM is normal. There is SHIRLEY 3 flow.     - Left Anterior Descending Artery:             The proximal LAD has a 40% stenosis. There is SHIRLEY 3 flow. The remaining portion of the vessel has luminal irregularities (10).     - D1:             The D1 has luminal irregularities. There is SHIRLEY 3 flow.     - Septal:             The septal has luminal irregularities. There is SHIRLEY 3 flow.     - Left Circumflex Artery:             The mid LCX has a 40% stenosis. There is SHIRLEY 3 flow. The remaining portion of the vessel has luminal irregularities (10).     - Ramus:             The ramus has luminal irregularities. There is SHIRLEY 3 flow.     - Right Coronary Artery:             The proximal RCA has a 30% stenosis. There is SHIRLEY 3 flow.             The distal RCA has a 95% stenosis. There is SHIRLEY 3 flow.                     Lesion Details:   The lesion is ulcerated.     - Posterior Descending Artery:             The ostial PDA has a 30% stenosis. There is SHIRLEY 3 flow. The remaining portion of the vessel has luminal irregularities (10).     - Posterior Lateral Branch:             The PLB has luminal irregularities. There is SHIRLEY  3 flow.      Intervention          Distal RCA:              The lesion was successfully intervened. Post-stenosis of 0% and post-SHIRLEY 3 flow. The vessel was accessed natively.  The following items were used: Blln Trek Rx 2.5 X 15 and Stent Resolute Rx 4.0 X 26 ([facility]).    Assessment:     1. Atherosclerosis of native coronary artery of native heart without angina pectoris    2. Hypertensive urgency    3. S/P primary angioplasty with coronary stent    4. Tobacco abuse        Plan:     Minoxidil 10 mg daily. BP log and take medications to next visit.  Low salt diet  Smoking cessation  Increase Activity    F/u in 4  Weeks    Clinic time spent with this patient is 20 minutes.

## 2017-12-18 ENCOUNTER — OFFICE VISIT (OUTPATIENT)
Dept: CARDIOLOGY | Facility: CLINIC | Age: 57
End: 2017-12-18
Payer: MEDICAID

## 2017-12-18 VITALS
HEART RATE: 68 BPM | WEIGHT: 160.19 LBS | OXYGEN SATURATION: 96 % | DIASTOLIC BLOOD PRESSURE: 74 MMHG | BODY MASS INDEX: 26.69 KG/M2 | HEIGHT: 65 IN | SYSTOLIC BLOOD PRESSURE: 153 MMHG

## 2017-12-18 DIAGNOSIS — Z72.0 TOBACCO ABUSE: ICD-10-CM

## 2017-12-18 DIAGNOSIS — I25.10 CORONARY ARTERY DISEASE INVOLVING NATIVE CORONARY ARTERY OF NATIVE HEART WITHOUT ANGINA PECTORIS: ICD-10-CM

## 2017-12-18 DIAGNOSIS — I25.10 ATHEROSCLEROSIS OF NATIVE CORONARY ARTERY OF NATIVE HEART WITHOUT ANGINA PECTORIS: ICD-10-CM

## 2017-12-18 DIAGNOSIS — I16.0 HYPERTENSIVE URGENCY: ICD-10-CM

## 2017-12-18 DIAGNOSIS — Z95.5 S/P PRIMARY ANGIOPLASTY WITH CORONARY STENT: Primary | ICD-10-CM

## 2017-12-18 PROCEDURE — 99214 OFFICE O/P EST MOD 30 MIN: CPT | Mod: S$GLB,,, | Performed by: INTERNAL MEDICINE

## 2017-12-18 NOTE — PROGRESS NOTES
Subjective:   Patient ID:  Yuval Fonseca is a 57 y.o. female who presents for follow-up of Follow-up      Problem List Items Addressed This Visit        Cardiac/Vascular    Atherosclerotic heart disease of native coronary artery without angina pectoris    S/P primary angioplasty with coronary stent - Primary    Coronary artery disease involving native coronary artery of native heart without angina pectoris    Hypertensive urgency       Other    Tobacco abuse          HPI: Patient is here for f/u of BP. BP log reviewed with patient and BP is elevated. BP checked here in clinic is better controlled.   She is taking norvasc 10 mg po daily, losartan 100 mg po daily, minoxidil 10 mg po daily and coreg 25 mg po bid. She said she is compliant with medication and take medications every day. She denies any neurological symptoms such as weakness, slurred speech or vision changes.     Patient has history of UA. LHC showed RCA lesion that was treated with stent. No chest pain or dyspnea. She continues to smoke.    She denies eating excessive salt. No etoh use. Repeat /82. HCTZ on medication list but patient not taking when reviewing all her pills.     Review of Systems   Constitution: Negative.   HENT: Negative.    Eyes: Negative.    Cardiovascular: Negative.    Respiratory: Negative.    Endocrine: Negative.    Hematologic/Lymphatic: Negative.    Skin: Negative.    Musculoskeletal: Negative.    Gastrointestinal: Negative.    Neurological: Negative.    Psychiatric/Behavioral: Negative.        Patient's Medications   New Prescriptions    No medications on file   Previous Medications    ALPRAZOLAM (XANAX) 1 MG TABLET    TAKE ONE TABLET BY MOUTH EVERY DAY FOR THREE DAYS    AMLODIPINE (NORVASC) 10 MG TABLET    Take 10 mg by mouth once daily.    ASPIRIN (ECOTRIN) 81 MG EC TABLET    Take 1 tablet (81 mg total) by mouth once daily.    ATORVASTATIN (LIPITOR) 80 MG TABLET    Take 1 tablet (80 mg total) by mouth once daily.     CARVEDILOL (COREG) 25 MG TABLET    Take 1 tablet (25 mg total) by mouth 2 (two) times daily with meals.    CLOPIDOGREL (PLAVIX) 75 MG TABLET    Take 1 tablet (75 mg total) by mouth once daily.    FLUCONAZOLE (DIFLUCAN) 200 MG TAB    Take 200 mg by mouth once daily.     HYDROCHLOROTHIAZIDE (HYDRODIURIL) 25 MG TABLET    Take 25 mg by mouth once daily.    LOSARTAN (COZAAR) 100 MG TABLET    Take 1 tablet (100 mg total) by mouth once daily.    MINOXIDIL (LONITEN) 10 MG TAB    Take 1 tablet (10 mg total) by mouth once daily.    NEOMYCIN-POLYMYXIN-HYDROCORTISONE (CORTISPORIN) OTIC SOLUTION    3 drops 3 (three) times daily.    NICOTINE (NICODERM CQ) 21 MG/24 HR    Place 1 patch onto the skin once daily.    NITROGLYCERIN (NITROSTAT) 0.4 MG SL TABLET    Place 1 tablet (0.4 mg total) under the tongue every 5 (five) minutes as needed for Chest pain.    RANITIDINE (ZANTAC) 150 MG TABLET    Take 150 mg by mouth 2 (two) times daily.    TRIAMCINOLONE ACETONIDE 0.025% (KENALOG) 0.025 % CREAM    Apply topically 2 (two) times daily.   Modified Medications    No medications on file   Discontinued Medications    No medications on file       Objective:   Physical Exam   Constitutional: She is oriented to person, place, and time. She appears well-developed and well-nourished. No distress.   Examination of the digits showed no clubbing or cyanosis   HENT:   Head: Normocephalic and atraumatic.   Eyes: Conjunctivae are normal. Pupils are equal, round, and reactive to light. Right eye exhibits no discharge.   Neck: Normal range of motion. Neck supple. No JVD present. No thyromegaly present.   No carotid bruits   Cardiovascular: Normal rate, regular rhythm, S1 normal, S2 normal, normal heart sounds, intact distal pulses and normal pulses.  PMI is not displaced.  Exam reveals no gallop, no friction rub and no opening snap.    No murmur heard.  Pulmonary/Chest: Effort normal and breath sounds normal. No respiratory distress. She has no wheezes.  She has no rales. She exhibits no tenderness.   Abdominal: Soft. Bowel sounds are normal. She exhibits no distension and no mass. There is no tenderness. There is no guarding.   No hepatosplenomegaly   Musculoskeletal: Normal range of motion. She exhibits no edema or tenderness.   Lymphadenopathy:     She has no cervical adenopathy.   Neurological: She is alert and oriented to person, place, and time.   Skin: Skin is warm. No rash noted. She is not diaphoretic. No erythema.   Psychiatric: She has a normal mood and affect.   Nursing note and vitals reviewed.      ECGs reviewed-NSR with T wave inversion in anterior and lateral leads  LABS reviewed  Imaging including Echoes reviewed- normal ef with DD  Angiographic Results     Diagnostic:          Patient has a right dominant coronary artery.        - Left Main Coronary Artery:             The LM is normal. There is SHIRLEY 3 flow.     - Left Anterior Descending Artery:             The proximal LAD has a 40% stenosis. There is SHIRLEY 3 flow. The remaining portion of the vessel has luminal irregularities (10).     - D1:             The D1 has luminal irregularities. There is SHIRLEY 3 flow.     - Septal:             The septal has luminal irregularities. There is SHIRLEY 3 flow.     - Left Circumflex Artery:             The mid LCX has a 40% stenosis. There is SHIRLEY 3 flow. The remaining portion of the vessel has luminal irregularities (10).     - Ramus:             The ramus has luminal irregularities. There is SHIRLEY 3 flow.     - Right Coronary Artery:             The proximal RCA has a 30% stenosis. There is SHIRLEY 3 flow.             The distal RCA has a 95% stenosis. There is SHIRLEY 3 flow.                     Lesion Details:   The lesion is ulcerated.     - Posterior Descending Artery:             The ostial PDA has a 30% stenosis. There is SHIRLEY 3 flow. The remaining portion of the vessel has luminal irregularities (10).     - Posterior Lateral Branch:             The PLB has  luminal irregularities. There is SHIRLEY 3 flow.      Intervention          Distal RCA:              The lesion was successfully intervened. Post-stenosis of 0% and post-SHIRLEY 3 flow. The vessel was accessed natively.  The following items were used: Blln Trek Rx 2.5 X 15 and Stent Resolute Rx 4.0 X 26 ([facility]).    Assessment:     1. S/P primary angioplasty with coronary stent    2. Coronary artery disease involving native coronary artery of native heart without angina pectoris    3. Atherosclerosis of native coronary artery of native heart without angina pectoris    4. Tobacco abuse    5. Hypertensive urgency        Plan:     Continue current medications.   Low salt diet  Smoking cessation highly encouraged  Increase Activity  Get new BP machine for home use  F/u in 2 months    Clinic time spent with this patient is 20 minutes.

## 2018-02-05 ENCOUNTER — OFFICE VISIT (OUTPATIENT)
Dept: CARDIOLOGY | Facility: CLINIC | Age: 58
End: 2018-02-05
Payer: MEDICAID

## 2018-02-05 VITALS
BODY MASS INDEX: 26.44 KG/M2 | WEIGHT: 158.69 LBS | OXYGEN SATURATION: 96 % | SYSTOLIC BLOOD PRESSURE: 169 MMHG | HEART RATE: 62 BPM | HEIGHT: 65 IN | DIASTOLIC BLOOD PRESSURE: 91 MMHG

## 2018-02-05 DIAGNOSIS — Z72.0 TOBACCO ABUSE: ICD-10-CM

## 2018-02-05 DIAGNOSIS — I25.10 CORONARY ARTERY DISEASE INVOLVING NATIVE CORONARY ARTERY OF NATIVE HEART WITHOUT ANGINA PECTORIS: ICD-10-CM

## 2018-02-05 DIAGNOSIS — Z95.5 S/P PRIMARY ANGIOPLASTY WITH CORONARY STENT: ICD-10-CM

## 2018-02-05 DIAGNOSIS — I10 HTN (HYPERTENSION), BENIGN: Primary | ICD-10-CM

## 2018-02-05 PROCEDURE — 99213 OFFICE O/P EST LOW 20 MIN: CPT | Mod: S$GLB,,, | Performed by: INTERNAL MEDICINE

## 2018-02-05 PROCEDURE — 3008F BODY MASS INDEX DOCD: CPT | Mod: S$GLB,,, | Performed by: INTERNAL MEDICINE

## 2018-02-05 NOTE — PROGRESS NOTES
Subjective:   Patient ID:  Yuval Fonseca is a 57 y.o. female who presents for follow-up of No chief complaint on file.      Problem List Items Addressed This Visit        Cardiac/Vascular    S/P primary angioplasty with coronary stent    Coronary artery disease involving native coronary artery of native heart without angina pectoris    HTN (hypertension), benign - Primary       Other    Tobacco abuse          HPI: Patient is here for f/u of BP and CAD. She is on multiple medications. She continues to smoke and is having occasional chest pain that occur at rest and last seconds\. No chest pain when exerting herself however like walking [employer].      She is taking norvasc 10 mg po daily, losartan 100 mg po daily, minoxidil 10 mg po daily and coreg 25 mg po bid. She said she is compliant with medication and take medications every day. She denies any neurological symptoms such as weakness, slurred speech or vision changes.     Patient has history of UA. LHC showed RCA lesion that was treated with stent. No chest pain or dyspnea. She continues to smoke.    Repeat 154/88    Review of Systems   Constitution: Negative.   HENT: Negative.    Eyes: Negative.    Cardiovascular: Negative.    Respiratory: Negative.    Endocrine: Negative.    Hematologic/Lymphatic: Negative.    Skin: Negative.    Musculoskeletal: Negative.    Gastrointestinal: Negative.    Neurological: Negative.    Psychiatric/Behavioral: Negative.        Patient's Medications   New Prescriptions    No medications on file   Previous Medications    ALPRAZOLAM (XANAX) 1 MG TABLET    TAKE ONE TABLET BY MOUTH EVERY DAY FOR THREE DAYS    AMLODIPINE (NORVASC) 10 MG TABLET    Take 10 mg by mouth once daily.    ASPIRIN (ECOTRIN) 81 MG EC TABLET    Take 1 tablet (81 mg total) by mouth once daily.    ATORVASTATIN (LIPITOR) 80 MG TABLET    Take 1 tablet (80 mg total) by mouth once daily.    CARVEDILOL (COREG) 25 MG TABLET    Take 1 tablet (25 mg total) by mouth 2 (two)  times daily with meals.    CLOPIDOGREL (PLAVIX) 75 MG TABLET    Take 1 tablet (75 mg total) by mouth once daily.    FLUCONAZOLE (DIFLUCAN) 200 MG TAB    Take 200 mg by mouth once daily.     LOSARTAN (COZAAR) 100 MG TABLET    Take 1 tablet (100 mg total) by mouth once daily.    MINOXIDIL (LONITEN) 10 MG TAB    Take 1 tablet (10 mg total) by mouth once daily.    NEOMYCIN-POLYMYXIN-HYDROCORTISONE (CORTISPORIN) OTIC SOLUTION    3 drops 3 (three) times daily.    NICOTINE (NICODERM CQ) 21 MG/24 HR    Place 1 patch onto the skin once daily.    NITROGLYCERIN (NITROSTAT) 0.4 MG SL TABLET    Place 1 tablet (0.4 mg total) under the tongue every 5 (five) minutes as needed for Chest pain.    RANITIDINE (ZANTAC) 150 MG TABLET    Take 150 mg by mouth 2 (two) times daily.    TRIAMCINOLONE ACETONIDE 0.025% (KENALOG) 0.025 % CREAM    Apply topically 2 (two) times daily.   Modified Medications    No medications on file   Discontinued Medications    No medications on file       Objective:   Physical Exam   Constitutional: She is oriented to person, place, and time. She appears well-developed and well-nourished. No distress.   Examination of the digits showed no clubbing or cyanosis   HENT:   Head: Normocephalic and atraumatic.   Eyes: Conjunctivae are normal. Pupils are equal, round, and reactive to light. Right eye exhibits no discharge.   Neck: Normal range of motion. Neck supple. No JVD present. No thyromegaly present.   No carotid bruits   Cardiovascular: Normal rate, regular rhythm, S1 normal, S2 normal, normal heart sounds, intact distal pulses and normal pulses.  PMI is not displaced.  Exam reveals no gallop, no friction rub and no opening snap.    No murmur heard.  Pulmonary/Chest: Effort normal and breath sounds normal. No respiratory distress. She has no wheezes. She has no rales. She exhibits no tenderness.   Abdominal: Soft. Bowel sounds are normal. She exhibits no distension and no mass. There is no tenderness. There is no  guarding.   No hepatosplenomegaly   Musculoskeletal: Normal range of motion. She exhibits no edema or tenderness.   Lymphadenopathy:     She has no cervical adenopathy.   Neurological: She is alert and oriented to person, place, and time.   Skin: Skin is warm. No rash noted. She is not diaphoretic. No erythema.   Psychiatric: She has a normal mood and affect.   Nursing note and vitals reviewed.      ECGs reviewed-NSR with T wave inversion in anterior and lateral leads  LABS reviewed  Imaging including Echoes reviewed- normal ef with DD  Angiographic Results     Diagnostic:          Patient has a right dominant coronary artery.        - Left Main Coronary Artery:             The LM is normal. There is SHIRLEY 3 flow.     - Left Anterior Descending Artery:             The proximal LAD has a 40% stenosis. There is SHIRLEY 3 flow. The remaining portion of the vessel has luminal irregularities (10).     - D1:             The D1 has luminal irregularities. There is SHIRLEY 3 flow.     - Septal:             The septal has luminal irregularities. There is SHIRLEY 3 flow.     - Left Circumflex Artery:             The mid LCX has a 40% stenosis. There is SHIRLEY 3 flow. The remaining portion of the vessel has luminal irregularities (10).     - Ramus:             The ramus has luminal irregularities. There is SHIRLEY 3 flow.     - Right Coronary Artery:             The proximal RCA has a 30% stenosis. There is SHIRLEY 3 flow.             The distal RCA has a 95% stenosis. There is SHIRLEY 3 flow.                     Lesion Details:   The lesion is ulcerated.     - Posterior Descending Artery:             The ostial PDA has a 30% stenosis. There is SHIRLEY 3 flow. The remaining portion of the vessel has luminal irregularities (10).     - Posterior Lateral Branch:             The PLB has luminal irregularities. There is SHIRLEY 3 flow.      Intervention          Distal RCA:              The lesion was successfully intervened. Post-stenosis of 0% and  post-SHIRLEY 3 flow. The vessel was accessed natively.  The following items were used: Blln Trek Rx 2.5 X 15 and Stent Resolute Rx 4.0 X 26 ([facility]).    Assessment:     1. HTN (hypertension), benign    2. Tobacco abuse    3. Coronary artery disease involving native coronary artery of native heart without angina pectoris    4. S/P primary angioplasty with coronary stent        Plan:     Continue current medications.   Low salt diet  Smoking cessation highly encouraged  Increase Activity  F/u in 6 months    Clinic time spent with this patient is 20 minutes.

## 2019-05-24 ENCOUNTER — OFFICE VISIT (OUTPATIENT)
Dept: CARDIOLOGY | Facility: CLINIC | Age: 59
End: 2019-05-24
Payer: MEDICAID

## 2019-05-24 VITALS
HEART RATE: 69 BPM | DIASTOLIC BLOOD PRESSURE: 109 MMHG | WEIGHT: 158.81 LBS | HEIGHT: 63 IN | SYSTOLIC BLOOD PRESSURE: 229 MMHG | BODY MASS INDEX: 28.14 KG/M2 | OXYGEN SATURATION: 98 %

## 2019-05-24 DIAGNOSIS — Z95.5 S/P PRIMARY ANGIOPLASTY WITH CORONARY STENT: ICD-10-CM

## 2019-05-24 DIAGNOSIS — I25.10 CORONARY ARTERY DISEASE INVOLVING NATIVE CORONARY ARTERY OF NATIVE HEART WITHOUT ANGINA PECTORIS: ICD-10-CM

## 2019-05-24 DIAGNOSIS — Z72.0 TOBACCO ABUSE: ICD-10-CM

## 2019-05-24 DIAGNOSIS — I25.10 ATHEROSCLEROSIS OF NATIVE CORONARY ARTERY OF NATIVE HEART WITHOUT ANGINA PECTORIS: ICD-10-CM

## 2019-05-24 DIAGNOSIS — I16.0 MALIGNANT HYPERTENSIVE URGENCY: Primary | ICD-10-CM

## 2019-05-24 PROCEDURE — 99214 PR OFFICE/OUTPT VISIT, EST, LEVL IV, 30-39 MIN: ICD-10-PCS | Mod: S$GLB,,, | Performed by: INTERNAL MEDICINE

## 2019-05-24 PROCEDURE — 99214 OFFICE O/P EST MOD 30 MIN: CPT | Mod: S$GLB,,, | Performed by: INTERNAL MEDICINE

## 2019-05-24 RX ORDER — CARVEDILOL 25 MG/1
25 TABLET ORAL 2 TIMES DAILY WITH MEALS
Qty: 60 TABLET | Refills: 11 | Status: SHIPPED | OUTPATIENT
Start: 2019-05-24 | End: 2020-05-23

## 2019-05-24 RX ORDER — LOSARTAN POTASSIUM 100 MG/1
100 TABLET ORAL DAILY
Qty: 30 TABLET | Refills: 5 | Status: SHIPPED | OUTPATIENT
Start: 2019-05-24

## 2019-05-24 RX ORDER — AMLODIPINE BESYLATE 10 MG/1
10 TABLET ORAL DAILY
Qty: 30 TABLET | Refills: 6 | Status: SHIPPED | OUTPATIENT
Start: 2019-05-24

## 2019-05-24 NOTE — PROGRESS NOTES
Subjective:   Patient ID:  Yuval Fonseca is a 59 y.o. female who presents for follow-up of Follow-up      Problem List Items Addressed This Visit        Cardiac/Vascular    Malignant hypertensive urgency - Primary    Relevant Medications    losartan (COZAAR) 100 MG tablet    carvedilol (COREG) 25 MG tablet    Atherosclerotic heart disease of native coronary artery without angina pectoris    S/P primary angioplasty with coronary stent    Coronary artery disease involving native coronary artery of native heart without angina pectoris       Other    Tobacco abuse          HPI: Patient is here for f/u of BP and CAD. She is here with elevated BP. She has not taken BP medications in 1 month. She said she had a syncopal episode and think it was due to medications. She did not discuss with a doctor. She is currently still smoking and having occasion chest tightness with exertion.       She is suppose to be taking norvasc 10 mg po daily, losartan 100 mg po daily, minoxidil 10 mg po daily and coreg 25 mg po bid.  She denies any neurological symptoms such as weakness, slurred speech or vision changes.     Patient has history of UA. LHC showed RCA lesion that was treated with stent. No chest pain or dyspnea. She continues to smoke.        Review of Systems   Constitution: Negative.   HENT: Negative.    Eyes: Negative.    Cardiovascular: Negative.    Respiratory: Negative.    Endocrine: Negative.    Hematologic/Lymphatic: Negative.    Skin: Negative.    Musculoskeletal: Negative.    Gastrointestinal: Negative.    Neurological: Negative.    Psychiatric/Behavioral: Negative.        Patient's Medications   New Prescriptions    No medications on file   Previous Medications    ALPRAZOLAM (XANAX) 1 MG TABLET    TAKE ONE TABLET BY MOUTH EVERY DAY FOR THREE DAYS    ASPIRIN (ECOTRIN) 81 MG EC TABLET    Take 1 tablet (81 mg total) by mouth once daily.    ATORVASTATIN (LIPITOR) 80 MG TABLET    Take 1 tablet (80 mg total) by mouth once  daily.    CLOPIDOGREL (PLAVIX) 75 MG TABLET    Take 1 tablet (75 mg total) by mouth once daily.    FLUCONAZOLE (DIFLUCAN) 200 MG TAB    Take 200 mg by mouth once daily.     MINOXIDIL (LONITEN) 10 MG TAB    Take 1 tablet (10 mg total) by mouth once daily.    NEOMYCIN-POLYMYXIN-HYDROCORTISONE (CORTISPORIN) OTIC SOLUTION    3 drops 3 (three) times daily.    NICOTINE (NICODERM CQ) 21 MG/24 HR    Place 1 patch onto the skin once daily.    NITROGLYCERIN (NITROSTAT) 0.4 MG SL TABLET    Place 1 tablet (0.4 mg total) under the tongue every 5 (five) minutes as needed for Chest pain.    RANITIDINE (ZANTAC) 150 MG TABLET    Take 150 mg by mouth 2 (two) times daily.    TRIAMCINOLONE ACETONIDE 0.025% (KENALOG) 0.025 % CREAM    Apply topically 2 (two) times daily.   Modified Medications    Modified Medication Previous Medication    AMLODIPINE (NORVASC) 10 MG TABLET amlodipine (NORVASC) 10 MG tablet       Take 1 tablet (10 mg total) by mouth once daily.    Take 10 mg by mouth once daily.    CARVEDILOL (COREG) 25 MG TABLET carvedilol (COREG) 25 MG tablet       Take 1 tablet (25 mg total) by mouth 2 (two) times daily with meals.    Take 1 tablet (25 mg total) by mouth 2 (two) times daily with meals.    LOSARTAN (COZAAR) 100 MG TABLET losartan (COZAAR) 100 MG tablet       Take 1 tablet (100 mg total) by mouth once daily.    Take 1 tablet (100 mg total) by mouth once daily.   Discontinued Medications    No medications on file       Objective:   Physical Exam   Constitutional: She is oriented to person, place, and time. She appears well-developed and well-nourished. No distress.   Examination of the digits showed no clubbing or cyanosis   HENT:   Head: Normocephalic and atraumatic.   Eyes: Pupils are equal, round, and reactive to light. Conjunctivae are normal. Right eye exhibits no discharge.   Neck: Normal range of motion. Neck supple. No JVD present. No thyromegaly present.   No carotid bruits   Cardiovascular: Normal rate, regular  rhythm, S1 normal, S2 normal, normal heart sounds, intact distal pulses and normal pulses. PMI is not displaced. Exam reveals no gallop, no friction rub and no opening snap.   No murmur heard.  Pulmonary/Chest: Effort normal and breath sounds normal. No respiratory distress. She has no wheezes. She has no rales. She exhibits no tenderness.   Abdominal: Soft. Bowel sounds are normal. She exhibits no distension and no mass. There is no tenderness. There is no guarding.   No hepatosplenomegaly   Musculoskeletal: Normal range of motion. She exhibits no edema or tenderness.   Lymphadenopathy:     She has no cervical adenopathy.   Neurological: She is alert and oriented to person, place, and time.   Skin: Skin is warm. No rash noted. She is not diaphoretic. No erythema.   Psychiatric: She has a normal mood and affect.   Nursing note and vitals reviewed.      ECGs reviewed-NSR with T wave inversion in anterior and lateral leads  LABS reviewed  Imaging including Echoes reviewed- normal ef with DD  Angiographic Results     Diagnostic:          Patient has a right dominant coronary artery.        - Left Main Coronary Artery:             The LM is normal. There is SHIRLEY 3 flow.     - Left Anterior Descending Artery:             The proximal LAD has a 40% stenosis. There is SHIRLEY 3 flow. The remaining portion of the vessel has luminal irregularities (10).     - D1:             The D1 has luminal irregularities. There is SHIRLEY 3 flow.     - Septal:             The septal has luminal irregularities. There is SHIRLEY 3 flow.     - Left Circumflex Artery:             The mid LCX has a 40% stenosis. There is SHIRLEY 3 flow. The remaining portion of the vessel has luminal irregularities (10).     - Ramus:             The ramus has luminal irregularities. There is SHIRLEY 3 flow.     - Right Coronary Artery:             The proximal RCA has a 30% stenosis. There is SHIRLEY 3 flow.             The distal RCA has a 95% stenosis. There is SHIRLEY 3  flow.                     Lesion Details:   The lesion is ulcerated.     - Posterior Descending Artery:             The ostial PDA has a 30% stenosis. There is SHIRLEY 3 flow. The remaining portion of the vessel has luminal irregularities (10).     - Posterior Lateral Branch:             The PLB has luminal irregularities. There is SHIRLEY 3 flow.      Intervention          Distal RCA:              The lesion was successfully intervened. Post-stenosis of 0% and post-SHIRLEY 3 flow. The vessel was accessed natively.  The following items were used: Blln Trek Rx 2.5 X 15 and Stent Resolute Rx 4.0 X 26 ([facility]).    Assessment:     1. Malignant hypertensive urgency    2. S/P primary angioplasty with coronary stent    3. Atherosclerosis of native coronary artery of native heart without angina pectoris    4. Coronary artery disease involving native coronary artery of native heart without angina pectoris    5. Tobacco abuse        Plan:   Restart medications coreg 25 mg po bid, norvasc 10 mg po daily and losartan 100 mg po daily  Low salt diet  Smoking cessation highly encouraged  Increase Activity  F/u in 1 month

## 2019-06-21 ENCOUNTER — OFFICE VISIT (OUTPATIENT)
Dept: CARDIOLOGY | Facility: CLINIC | Age: 59
End: 2019-06-21
Payer: MEDICAID

## 2019-06-21 ENCOUNTER — OUTSIDE PLACE OF SERVICE (OUTPATIENT)
Dept: CARDIOLOGY | Facility: CLINIC | Age: 59
End: 2019-06-21
Payer: MEDICAID

## 2019-06-21 VITALS
OXYGEN SATURATION: 97 % | WEIGHT: 157.13 LBS | SYSTOLIC BLOOD PRESSURE: 218 MMHG | DIASTOLIC BLOOD PRESSURE: 116 MMHG | BODY MASS INDEX: 27.84 KG/M2 | HEIGHT: 63 IN | HEART RATE: 63 BPM

## 2019-06-21 DIAGNOSIS — I25.10 ATHEROSCLEROSIS OF NATIVE CORONARY ARTERY OF NATIVE HEART WITHOUT ANGINA PECTORIS: ICD-10-CM

## 2019-06-21 DIAGNOSIS — I16.0 HYPERTENSIVE URGENCY: Primary | ICD-10-CM

## 2019-06-21 DIAGNOSIS — Z95.5 S/P PRIMARY ANGIOPLASTY WITH CORONARY STENT: ICD-10-CM

## 2019-06-21 DIAGNOSIS — Z72.0 TOBACCO ABUSE: ICD-10-CM

## 2019-06-21 DIAGNOSIS — I25.10 CORONARY ARTERY DISEASE INVOLVING NATIVE CORONARY ARTERY OF NATIVE HEART WITHOUT ANGINA PECTORIS: ICD-10-CM

## 2019-06-21 PROCEDURE — 93010 ELECTROCARDIOGRAM REPORT: CPT | Mod: ,,, | Performed by: STUDENT IN AN ORGANIZED HEALTH CARE EDUCATION/TRAINING PROGRAM

## 2019-06-21 PROCEDURE — 99214 PR OFFICE/OUTPT VISIT, EST, LEVL IV, 30-39 MIN: ICD-10-PCS | Mod: S$GLB,,, | Performed by: INTERNAL MEDICINE

## 2019-06-21 PROCEDURE — 93010 PR ELECTROCARDIOGRAM REPORT: ICD-10-PCS | Mod: ,,, | Performed by: STUDENT IN AN ORGANIZED HEALTH CARE EDUCATION/TRAINING PROGRAM

## 2019-06-21 PROCEDURE — 99214 OFFICE O/P EST MOD 30 MIN: CPT | Mod: S$GLB,,, | Performed by: INTERNAL MEDICINE

## 2019-06-21 RX ORDER — CLOPIDOGREL BISULFATE 75 MG/1
75 TABLET ORAL DAILY
Qty: 30 TABLET | Refills: 11 | Status: SHIPPED | OUTPATIENT
Start: 2019-06-21

## 2019-06-21 NOTE — PROGRESS NOTES
Subjective:   Patient ID:  Yuval Fonseca is a 59 y.o. female who presents for follow-up of Follow-up      Problem List Items Addressed This Visit        Cardiac/Vascular    Atherosclerotic heart disease of native coronary artery without angina pectoris    S/P primary angioplasty with coronary stent    Coronary artery disease involving native coronary artery of native heart without angina pectoris    Hypertensive urgency - Primary       Other    Tobacco abuse          HPI: Patient is here for f/u of BP and CAD. She is here with elevated BP. She said she has been taking but BP meds and have only missed taking it on time one day.  she is having cramps in neck.  Patient has history of UA. LHC showed RCA lesion that was treated with stent. No chest pain or dyspnea. She continues to smoke.        Review of Systems   Constitution: Negative.   HENT: Negative.    Eyes: Negative.    Cardiovascular: Negative.    Respiratory: Negative.    Endocrine: Negative.    Hematologic/Lymphatic: Negative.    Skin: Negative.    Musculoskeletal: Negative.    Gastrointestinal: Negative.    Neurological: Negative.    Psychiatric/Behavioral: Negative.        Patient's Medications   New Prescriptions    No medications on file   Previous Medications    ALPRAZOLAM (XANAX) 1 MG TABLET    TAKE ONE TABLET BY MOUTH EVERY DAY FOR THREE DAYS    AMLODIPINE (NORVASC) 10 MG TABLET    Take 1 tablet (10 mg total) by mouth once daily.    ASPIRIN (ECOTRIN) 81 MG EC TABLET    Take 1 tablet (81 mg total) by mouth once daily.    ATORVASTATIN (LIPITOR) 80 MG TABLET    Take 1 tablet (80 mg total) by mouth once daily.    CARVEDILOL (COREG) 25 MG TABLET    Take 1 tablet (25 mg total) by mouth 2 (two) times daily with meals.    CLOPIDOGREL (PLAVIX) 75 MG TABLET    Take 1 tablet (75 mg total) by mouth once daily.    FLUCONAZOLE (DIFLUCAN) 200 MG TAB    Take 200 mg by mouth once daily.     LOSARTAN (COZAAR) 100 MG TABLET    Take 1 tablet (100 mg total) by mouth once  daily.    NEOMYCIN-POLYMYXIN-HYDROCORTISONE (CORTISPORIN) OTIC SOLUTION    3 drops 3 (three) times daily.    NICOTINE (NICODERM CQ) 21 MG/24 HR    Place 1 patch onto the skin once daily.    NITROGLYCERIN (NITROSTAT) 0.4 MG SL TABLET    Place 1 tablet (0.4 mg total) under the tongue every 5 (five) minutes as needed for Chest pain.    RANITIDINE (ZANTAC) 150 MG TABLET    Take 150 mg by mouth 2 (two) times daily.    TRIAMCINOLONE ACETONIDE 0.025% (KENALOG) 0.025 % CREAM    Apply topically 2 (two) times daily.   Modified Medications    No medications on file   Discontinued Medications    MINOXIDIL (LONITEN) 10 MG TAB    Take 1 tablet (10 mg total) by mouth once daily.       Objective:   Physical Exam   Constitutional: She is oriented to person, place, and time. She appears well-developed and well-nourished. No distress.   Examination of the digits showed no clubbing or cyanosis   HENT:   Head: Normocephalic and atraumatic.   Eyes: Pupils are equal, round, and reactive to light. Conjunctivae are normal. Right eye exhibits no discharge.   Neck: Normal range of motion. Neck supple. No JVD present. No thyromegaly present.   No carotid bruits   Cardiovascular: Normal rate, regular rhythm, S1 normal, S2 normal, normal heart sounds, intact distal pulses and normal pulses. PMI is not displaced. Exam reveals no gallop, no friction rub and no opening snap.   No murmur heard.  Pulmonary/Chest: Effort normal and breath sounds normal. No respiratory distress. She has no wheezes. She has no rales. She exhibits no tenderness.   Abdominal: Soft. Bowel sounds are normal. She exhibits no distension and no mass. There is no tenderness. There is no guarding.   No hepatosplenomegaly   Musculoskeletal: Normal range of motion. She exhibits no edema or tenderness.   Lymphadenopathy:     She has no cervical adenopathy.   Neurological: She is alert and oriented to person, place, and time.   Skin: Skin is warm. No rash noted. She is not  diaphoretic. No erythema.   Psychiatric: She has a normal mood and affect.   Nursing note and vitals reviewed.      ECGs reviewed-NSR with T wave inversion in anterior and lateral leads  LABS reviewed  Imaging including Echoes reviewed- normal ef with DD  Angiographic Results     Diagnostic:          Patient has a right dominant coronary artery.        - Left Main Coronary Artery:             The LM is normal. There is SHIRLEY 3 flow.     - Left Anterior Descending Artery:             The proximal LAD has a 40% stenosis. There is SHIRLEY 3 flow. The remaining portion of the vessel has luminal irregularities (10).     - D1:             The D1 has luminal irregularities. There is SHIRLEY 3 flow.     - Septal:             The septal has luminal irregularities. There is SHIRLEY 3 flow.     - Left Circumflex Artery:             The mid LCX has a 40% stenosis. There is SHIRLEY 3 flow. The remaining portion of the vessel has luminal irregularities (10).     - Ramus:             The ramus has luminal irregularities. There is SHIRLEY 3 flow.     - Right Coronary Artery:             The proximal RCA has a 30% stenosis. There is SHIRLEY 3 flow.             The distal RCA has a 95% stenosis. There is SHIRLEY 3 flow.                     Lesion Details:   The lesion is ulcerated.     - Posterior Descending Artery:             The ostial PDA has a 30% stenosis. There is SHIRLEY 3 flow. The remaining portion of the vessel has luminal irregularities (10).     - Posterior Lateral Branch:             The PLB has luminal irregularities. There is SHIRLEY 3 flow.      Intervention          Distal RCA:              The lesion was successfully intervened. Post-stenosis of 0% and post-SHIRLEY 3 flow. The vessel was accessed natively.  The following items were used: Blln Trek Rx 2.5 X 15 and Stent Resolute Rx 4.0 X 26 ([facility]).    Assessment:     1. Hypertensive urgency    2. Coronary artery disease involving native coronary artery of native heart without angina pectoris    3.  S/P primary angioplasty with coronary stent    4. Atherosclerosis of native coronary artery of native heart without angina pectoris    5. Tobacco abuse        Plan:     Patient is non compliant with medications including plavix  Still smoking  Will send to ER

## 2019-10-17 ENCOUNTER — TELEPHONE (OUTPATIENT)
Dept: CARDIOLOGY | Facility: CLINIC | Age: 59
End: 2019-10-17

## 2019-10-17 NOTE — TELEPHONE ENCOUNTER
----- Message from Ben Camara sent at 10/17/2019  8:24 AM CDT -----  Contact: same  Patient called in and stated she received a letter in the mail that Dr. Bacon has left but wants to know who she is supposed to be seeing for her check ups?  Patient would like a call back at [phone]

## 2019-11-15 ENCOUNTER — OFFICE VISIT (OUTPATIENT)
Dept: CARDIOLOGY | Facility: CLINIC | Age: 59
End: 2019-11-15
Payer: MEDICAID

## 2019-11-15 VITALS
DIASTOLIC BLOOD PRESSURE: 129 MMHG | HEART RATE: 72 BPM | SYSTOLIC BLOOD PRESSURE: 250 MMHG | WEIGHT: 157.19 LBS | OXYGEN SATURATION: 96 % | HEIGHT: 63 IN | BODY MASS INDEX: 27.85 KG/M2

## 2019-11-15 DIAGNOSIS — I16.0 MALIGNANT HYPERTENSIVE URGENCY: ICD-10-CM

## 2019-11-15 DIAGNOSIS — I25.10 ATHEROSCLEROSIS OF NATIVE CORONARY ARTERY OF NATIVE HEART WITHOUT ANGINA PECTORIS: Primary | ICD-10-CM

## 2019-11-15 DIAGNOSIS — I10 HTN (HYPERTENSION), BENIGN: ICD-10-CM

## 2019-11-15 DIAGNOSIS — Z95.5 S/P PRIMARY ANGIOPLASTY WITH CORONARY STENT: ICD-10-CM

## 2019-11-15 PROCEDURE — 99214 PR OFFICE/OUTPT VISIT, EST, LEVL IV, 30-39 MIN: ICD-10-PCS | Mod: S$GLB,,, | Performed by: STUDENT IN AN ORGANIZED HEALTH CARE EDUCATION/TRAINING PROGRAM

## 2019-11-15 PROCEDURE — 99214 OFFICE O/P EST MOD 30 MIN: CPT | Mod: S$GLB,,, | Performed by: STUDENT IN AN ORGANIZED HEALTH CARE EDUCATION/TRAINING PROGRAM

## 2019-11-15 RX ORDER — SPIRONOLACTONE 25 MG/1
12.5 TABLET ORAL DAILY
Qty: 15 TABLET | Refills: 5 | Status: SHIPPED | OUTPATIENT
Start: 2019-11-15 | End: 2019-12-27

## 2019-11-15 RX ORDER — CALCIUM CARBONATE 750 MG/1
1 TABLET, CHEWABLE ORAL DAILY
Qty: 1 EACH | Refills: 2 | Status: SHIPPED | OUTPATIENT
Start: 2019-11-15

## 2019-11-15 NOTE — PROGRESS NOTES
"   Cardiology Clinic note    Subjective:   Patient ID:  Yuval Fonseca is a 59 y.o. female who presents for follow-up of uncontrolled HTN    HPI:   Yuval Fonseca  has a past medical history of Hypertension.    11/15: Here for follow up of uncontrolled HTN. She had hx of RCA stent in 2017 for uncontrolled HTN/CP. Pt is doing well overall. Pt notes for the past 2 months, left chest burning sensation before bed. Feels like stretching sensation in her left side of her chest. Sometimes improves with tylenol. intermittment  She doesn't have symptoms when she is active. Only feels it the most when she is relaxed. She is able to fish w/o limitations. Takes care of her grandchildren w/o issues.  Notes some atypical HA but ongoing for several months.    Pt is very hesitant to take any more BP medications. She states the last time she was taking more BP she passed out. She thinks she is better when her SBP > 200. Didn't take am medications. Pt is not willing to go to the ER. Pt is agreeable to try low dose medications with close follow up. Will get a home bp cuff and make a log.      Vitals  Vitals:    11/15/19 0948   BP: (!) 250/129   Pulse: 72   SpO2: 96%   Weight: 71.3 kg (157 lb 3.2 oz)   Height: 5' 3" (1.6 m)       Patient Active Problem List    Diagnosis Date Noted    HTN (hypertension), benign 02/05/2018    Hypertensive urgency 12/01/2017    S/P primary angioplasty with coronary stent 09/25/2017    Coronary artery disease involving native coronary artery of native heart without angina pectoris 09/25/2017    Atherosclerotic heart disease of native coronary artery without angina pectoris 09/13/2017    Unstable angina 09/11/2017    Malignant hypertensive urgency 09/11/2017    Tobacco abuse 09/11/2017       Patient's Medications   New Prescriptions    BLOOD PRESSURE TEST KIT-MEDIUM KIT    1 Units by Misc.(Non-Drug; Combo Route) route once daily.    SPIRONOLACTONE (ALDACTONE) 25 MG TABLET    Take 0.5 tablets (12.5 " mg total) by mouth once daily.   Previous Medications    ALPRAZOLAM (XANAX) 1 MG TABLET    TAKE ONE TABLET BY MOUTH EVERY DAY FOR THREE DAYS    AMLODIPINE (NORVASC) 10 MG TABLET    Take 1 tablet (10 mg total) by mouth once daily.    ASPIRIN (ECOTRIN) 81 MG EC TABLET    Take 1 tablet (81 mg total) by mouth once daily.    ATORVASTATIN (LIPITOR) 80 MG TABLET    Take 1 tablet (80 mg total) by mouth once daily.    CARVEDILOL (COREG) 25 MG TABLET    Take 1 tablet (25 mg total) by mouth 2 (two) times daily with meals.    CLOPIDOGREL (PLAVIX) 75 MG TABLET    Take 1 tablet (75 mg total) by mouth once daily.    FLUCONAZOLE (DIFLUCAN) 200 MG TAB    Take 200 mg by mouth once daily.     LOSARTAN (COZAAR) 100 MG TABLET    Take 1 tablet (100 mg total) by mouth once daily.    NEOMYCIN-POLYMYXIN-HYDROCORTISONE (CORTISPORIN) OTIC SOLUTION    3 drops 3 (three) times daily.    NICOTINE (NICODERM CQ) 21 MG/24 HR    Place 1 patch onto the skin once daily.    NITROGLYCERIN (NITROSTAT) 0.4 MG SL TABLET    Place 1 tablet (0.4 mg total) under the tongue every 5 (five) minutes as needed for Chest pain.    RANITIDINE (ZANTAC) 150 MG TABLET    Take 150 mg by mouth 2 (two) times daily.    TRIAMCINOLONE ACETONIDE 0.025% (KENALOG) 0.025 % CREAM    Apply topically 2 (two) times daily.   Modified Medications    No medications on file   Discontinued Medications    No medications on file         Review of Systems   Constitution: Negative for chills, decreased appetite, malaise/fatigue and weight gain.   HENT: Negative for congestion and ear discharge.    Eyes: Positive for blurred vision. Negative for double vision.   Cardiovascular: Negative for chest pain, cyanosis, dyspnea on exertion, irregular heartbeat, leg swelling, near-syncope, orthopnea, palpitations and syncope.   Respiratory: Negative for cough, shortness of breath and sleep disturbances due to breathing.    Skin: Negative for color change and dry skin.   Musculoskeletal: Negative for  joint pain, joint swelling and muscle cramps.   Gastrointestinal: Negative for bloating, heartburn, hematemesis and hematochezia.   Genitourinary: Negative for bladder incontinence and dysuria.   Neurological: Positive for headaches. Negative for aphonia, excessive daytime sleepiness, dizziness, focal weakness, light-headedness, loss of balance and weakness.   Psychiatric/Behavioral: Negative for altered mental status, depression and memory loss. The patient does not have insomnia and is not nervous/anxious.          Objective:   Physical Exam   Constitutional: She is oriented to person, place, and time. She appears well-developed and well-nourished.   HENT:   Head: Normocephalic and atraumatic.   Eyes: Conjunctivae and EOM are normal.   Neck: Normal range of motion. Neck supple. No JVD present.   Cardiovascular: Normal rate, regular rhythm and normal heart sounds.   No murmur heard.  Pulmonary/Chest: Effort normal and breath sounds normal. No respiratory distress. She has no wheezes. She has no rales.   Abdominal: Soft. Bowel sounds are normal. She exhibits no distension.   Musculoskeletal: Normal range of motion. She exhibits no edema.   Neurological: She is alert and oriented to person, place, and time.   Skin: Skin is warm and dry. No erythema.   Psychiatric: She has a normal mood and affect. Her behavior is normal. Judgment and thought content normal.   Nursing note and vitals reviewed.      Lab Results    Lab Results   Component Value Date     09/13/2017    K 3.4 (L) 09/13/2017     09/13/2017    CO2 24 09/13/2017    BUN 14 09/13/2017    CREATININE 0.7 09/13/2017     09/13/2017    WBC 9.45 09/13/2017    HGB 15.3 09/13/2017    HCT 46.4 09/13/2017    MCV 89 09/13/2017     09/13/2017    INR 0.9 09/12/2017    CHOL 187 09/12/2017    HDL 50 09/12/2017    LDLCALC 105.6 09/12/2017    TRIG 157 (H) 09/12/2017     (H) 09/11/2017       Lipid panel  Lab Results   Component Value Date    CHOL  187 09/12/2017     Lab Results   Component Value Date    HDL 50 09/12/2017     Lab Results   Component Value Date    LDLCALC 105.6 09/12/2017     Lab Results   Component Value Date    TRIG 157 (H) 09/12/2017       Cardiac Studies  Significant Imaging: Echocardiogram:   2D echo with color flow doppler:   Results for orders placed or performed during the hospital encounter of 09/11/17   2D echo with color flow doppler   Result Value Ref Range    QEF 60 55 - 65    Diastolic Dysfunction Yes (A)     Mitral Valve Mobility NORMAL     Tricuspid Valve Regurgitation TRIVIAL     Narrative    Date of Procedure: 09/12/2017        TEST DESCRIPTION       Aorta: The aortic root is normal in size, measuring 3.0 cm at sinotubular junction.     Left Atrium: The left atrial volume index is normal, measuring 31.99 cc/m2.     Left Ventricle: The left ventricle is normal in size, with an end-diastolic diameter of 4.1 cm, and an end-systolic diameter of 3.0 cm. LV wall thickness is normal, with the septum measuring 1.2 cm and the posterior wall measuring 1.1 cm across. Relative   wall thickness was increased at 0.54, and the LV mass index was increased at 108.9 g/m2 consistent with concentric left ventricular hypertrophy. There are no regional wall motion abnormalities. Left ventricular systolic function appears normal. Visually   estimated ejection fraction is 60-65%. The LV Doppler derived stroke volume equals 65.0 ccs.     Diastolic indices: E wave velocity 0.8 m/s, E/A ratio 0.9,  msec., E/e' ratio(lat) 11. Mean left atrial pressures are increased. LVEDP is estimated to be 15 mmHg. There is diastolic dysfunction secondary to relaxation abnormality.     Right Atrium: The right atrium is normal in size, measuring 4.0 cm in length in the apical view.     Right Ventricle: The right ventricle is normal in size measuring 3.4 cm at the base in the apical right ventricle-focused view. Global right ventricular systolic function appears  normal.     Aortic Valve:  The aortic valve is mildly sclerotic with normal leaflet mobility. The aortic valve is tri-leaflet in structure.     Mitral Valve:  The mitral valve is normal in structure with normal leaflet mobility.     Tricuspid Valve:  The tricuspid valve is normal in structure with normal leaflet mobility. There is trivial tricuspid regurgitation.     Pulmonary Valve:  The pulmonic valve is not well seen.     IVC: IVC is normal in size and collapses > 50% with a sniff, suggesting normal right atrial pressure of 3 mmHg.     Atrial Septum: The atrial septum is intact.     Intracavitary: There is no evidence of pericardial effusion, intracavity mass, thrombi, or vegetation.         CONCLUSIONS     1 - Normal left ventricular systolic function (EF 60-65%).     2 - Impaired LV relaxation, increased LVEDP.     3 - Normal right ventricular systolic function .     4 - No wall motion abnormalities.             This document has been electronically    SIGNED BY: Yamilka Bacon MD On: 09/12/2017 14:44    and Transthoracic echo (TTE) complete (Cupid Only): No results found for this or any previous visit.  ECG:  , normal sinus rhythm, diffuse TWI  unchanged from previous tracings.    Cath study : 2017  Diagnostic:          Patient has a right dominant coronary artery.        - Left Main Coronary Artery:             The LM is normal. There is SHIRLEY 3 flow.     - Left Anterior Descending Artery:             The proximal LAD has a 40% stenosis. There is SHIRLEY 3 flow. The remaining portion of the vessel has luminal irregularities (10).     - D1:             The D1 has luminal irregularities. There is SHIRLEY 3 flow.     - Septal:             The septal has luminal irregularities. There is SHIRLEY 3 flow.     - Left Circumflex Artery:             The mid LCX has a 40% stenosis. There is SHIRLEY 3 flow. The remaining portion of the vessel has luminal irregularities (10).     - Ramus:             The ramus has luminal  irregularities. There is SHIRLEY 3 flow.     - Right Coronary Artery:             The proximal RCA has a 30% stenosis. There is SHIRLEY 3 flow.             The distal RCA has a 95% stenosis. There is SHIRLEY 3 flow.                     Lesion Details:   The lesion is ulcerated.     - Posterior Descending Artery:             The ostial PDA has a 30% stenosis. There is SHIRLEY 3 flow. The remaining portion of the vessel has luminal irregularities (10).     - Posterior Lateral Branch:             The PLB has luminal irregularities. There is SHIRLEY 3 flow.      Intervention          Distal RCA:              The lesion was successfully intervened. Post-stenosis of 0% and post-SHIRLEY 3 flow. The vessel was accessed natively.  The following items were used: Blln Trek Rx 2.5 X 15 and Stent Resolute Rx 4.0 X 26 ([facility]).     Assessment:     1. Atherosclerosis of native coronary artery of native heart without angina pectoris    2. HTN (hypertension), benign    3. Malignant hypertensive urgency    4. S/P primary angioplasty with coronary stent        Plan:     1. Malignant HTN  - mostly asymptomatic  - states she is compliant with her home medications but doesn't take them before clinic. ADvised to always take her medications before clinic unless advised not too.  - she is will to try a low dose spirolactone and then come back next week for a bp check and symptom check.  - will offically follow up in 2 weeks.    2. CAD with RCA stent  - stable. C/w asa/plavix/statin    Continue with current medical plan and lifestyle changes.  Return sooner for concerns or questions. If symptoms persist go to the ED  Total duration of face to face visit time 30 minutes.  Total time spent counseling greater than fifty percent of total visit time.  Counseling included discussion regarding imaging findings, diagnosis, possibilities, treatment options, risks and benefits.      No orders of the defined types were placed in this encounter.      Follow up as scheduled.  Return to clinic in 2 weeks, 1 week bp check   She expressed verbal understanding and agreed with the plan    Thank you for the opportunity to care for this patient. Will be available for questions if needed.     Vivek Benitez MD Merged with [facility]  Interventional Cardiology  [facility]  Pager: [phone]

## 2019-11-22 ENCOUNTER — TELEPHONE (OUTPATIENT)
Dept: CARDIOLOGY | Facility: CLINIC | Age: 59
End: 2019-11-22

## 2019-12-06 ENCOUNTER — OFFICE VISIT (OUTPATIENT)
Dept: CARDIOLOGY | Facility: CLINIC | Age: 59
End: 2019-12-06
Payer: MEDICAID

## 2019-12-06 VITALS
HEART RATE: 55 BPM | WEIGHT: 156.13 LBS | BODY MASS INDEX: 27.66 KG/M2 | OXYGEN SATURATION: 95 % | SYSTOLIC BLOOD PRESSURE: 155 MMHG | DIASTOLIC BLOOD PRESSURE: 84 MMHG | HEIGHT: 63 IN

## 2019-12-06 DIAGNOSIS — I16.0 MALIGNANT HYPERTENSIVE URGENCY: ICD-10-CM

## 2019-12-06 DIAGNOSIS — I10 HTN (HYPERTENSION), BENIGN: ICD-10-CM

## 2019-12-06 DIAGNOSIS — Z72.0 TOBACCO ABUSE: Primary | ICD-10-CM

## 2019-12-06 DIAGNOSIS — I25.10 ATHEROSCLEROSIS OF NATIVE CORONARY ARTERY OF NATIVE HEART WITHOUT ANGINA PECTORIS: ICD-10-CM

## 2019-12-06 PROCEDURE — 99214 PR OFFICE/OUTPT VISIT, EST, LEVL IV, 30-39 MIN: ICD-10-PCS | Mod: S$GLB,,, | Performed by: STUDENT IN AN ORGANIZED HEALTH CARE EDUCATION/TRAINING PROGRAM

## 2019-12-06 PROCEDURE — 99214 OFFICE O/P EST MOD 30 MIN: CPT | Mod: S$GLB,,, | Performed by: STUDENT IN AN ORGANIZED HEALTH CARE EDUCATION/TRAINING PROGRAM

## 2019-12-06 NOTE — PROGRESS NOTES
"   Cardiology Clinic note    Subjective:   Patient ID:  Yuval Fonseca is a 59 y.o. female who presents for follow-up of uncontrolled HTN    HPI:   Yuval Fonseca  has a past medical history of Hypertension.    11/15: Here for follow up of uncontrolled HTN. She had hx of RCA stent in 2017 for uncontrolled HTN/CP. Pt is doing well overall. Pt notes for the past 2 months, left chest burning sensation before bed. Feels like stretching sensation in her left side of her chest. Sometimes improves with tylenol. intermittment  She doesn't have symptoms when she is active. Only feels it the most when she is relaxed. She is able to fish w/o limitations. Takes care of her grandchildren w/o issues.  Notes some atypical HA but ongoing for several months.    Pt is very hesitant to take any more BP medications. She states the last time she was taking more BP she passed out. She thinks she is better when her SBP > 200. Didn't take am medications. Pt is not willing to go to the ER. Pt is agreeable to try low dose medications with close follow up. Will get a home bp cuff and make a log.    12/6/19: Here for follow up BP. She has been compliant with her home norvasc, coreg, losartan. She notes more HA, fatigue - daytime sleepiness with spirolactone. Stopped the medication 3 days to confirm the spirolactone was causing her issues. She took this am.  Bp drastically better today. Goal SBP was 180's from previously 250.     Vitals  Vitals:    12/06/19 0945   BP: (!) 155/84   Pulse: (!) 55   SpO2: 95%   Weight: 70.8 kg (156 lb 1.6 oz)   Height: 5' 3" (1.6 m)       Patient Active Problem List    Diagnosis Date Noted    HTN (hypertension), benign 02/05/2018    Hypertensive urgency 12/01/2017    S/P primary angioplasty with coronary stent 09/25/2017    Coronary artery disease involving native coronary artery of native heart without angina pectoris 09/25/2017    Atherosclerotic heart disease of native coronary artery without angina " pectoris 09/13/2017    Unstable angina 09/11/2017    Malignant hypertensive urgency 09/11/2017    Tobacco abuse 09/11/2017       Patient's Medications   New Prescriptions    No medications on file   Previous Medications    ALPRAZOLAM (XANAX) 1 MG TABLET    TAKE ONE TABLET BY MOUTH EVERY DAY FOR THREE DAYS    AMLODIPINE (NORVASC) 10 MG TABLET    Take 1 tablet (10 mg total) by mouth once daily.    ASPIRIN (ECOTRIN) 81 MG EC TABLET    Take 1 tablet (81 mg total) by mouth once daily.    ATORVASTATIN (LIPITOR) 80 MG TABLET    Take 1 tablet (80 mg total) by mouth once daily.    BLOOD PRESSURE TEST KIT-MEDIUM KIT    1 Units by Misc.(Non-Drug; Combo Route) route once daily.    CARVEDILOL (COREG) 25 MG TABLET    Take 1 tablet (25 mg total) by mouth 2 (two) times daily with meals.    CLOPIDOGREL (PLAVIX) 75 MG TABLET    Take 1 tablet (75 mg total) by mouth once daily.    FLUCONAZOLE (DIFLUCAN) 200 MG TAB    Take 200 mg by mouth once daily.     LOSARTAN (COZAAR) 100 MG TABLET    Take 1 tablet (100 mg total) by mouth once daily.    NEOMYCIN-POLYMYXIN-HYDROCORTISONE (CORTISPORIN) OTIC SOLUTION    3 drops 3 (three) times daily.    NICOTINE (NICODERM CQ) 21 MG/24 HR    Place 1 patch onto the skin once daily.    NITROGLYCERIN (NITROSTAT) 0.4 MG SL TABLET    Place 1 tablet (0.4 mg total) under the tongue every 5 (five) minutes as needed for Chest pain.    RANITIDINE (ZANTAC) 150 MG TABLET    Take 150 mg by mouth 2 (two) times daily.    SPIRONOLACTONE (ALDACTONE) 25 MG TABLET    Take 0.5 tablets (12.5 mg total) by mouth once daily.    TRIAMCINOLONE ACETONIDE 0.025% (KENALOG) 0.025 % CREAM    Apply topically 2 (two) times daily.   Modified Medications    No medications on file   Discontinued Medications    No medications on file         Review of Systems   Constitution: Positive for malaise/fatigue. Negative for chills, decreased appetite and weight gain.   HENT: Negative for congestion and ear discharge.    Eyes: Positive for  blurred vision. Negative for double vision.   Cardiovascular: Negative for chest pain, cyanosis, dyspnea on exertion, irregular heartbeat, leg swelling, near-syncope, orthopnea, palpitations and syncope.   Respiratory: Negative for cough, shortness of breath and sleep disturbances due to breathing.    Skin: Negative for color change and dry skin.   Musculoskeletal: Negative for joint pain, joint swelling and muscle cramps.   Gastrointestinal: Negative for bloating, heartburn, hematemesis and hematochezia.   Genitourinary: Negative for bladder incontinence and dysuria.   Neurological: Positive for headaches. Negative for aphonia, excessive daytime sleepiness, dizziness, focal weakness, light-headedness, loss of balance and weakness.   Psychiatric/Behavioral: Negative for altered mental status, depression and memory loss. The patient does not have insomnia and is not nervous/anxious.          Objective:   Physical Exam   Constitutional: She is oriented to person, place, and time. She appears well-developed and well-nourished.   HENT:   Head: Normocephalic and atraumatic.   Eyes: Conjunctivae and EOM are normal.   Neck: Normal range of motion. Neck supple. No JVD present.   Cardiovascular: Normal rate, regular rhythm and normal heart sounds.   No murmur heard.  Pulmonary/Chest: Effort normal and breath sounds normal. No respiratory distress. She has no wheezes. She has no rales.   Abdominal: Soft. Bowel sounds are normal. She exhibits no distension.   Musculoskeletal: Normal range of motion. She exhibits no edema.   Neurological: She is alert and oriented to person, place, and time.   Skin: Skin is warm and dry. No erythema.   Psychiatric: She has a normal mood and affect. Her behavior is normal. Judgment and thought content normal.   Nursing note and vitals reviewed.      Lab Results    Lab Results   Component Value Date     09/13/2017    K 3.4 (L) 09/13/2017     09/13/2017    CO2 24 09/13/2017    BUN 14  09/13/2017    CREATININE 0.7 09/13/2017     09/13/2017    WBC 9.45 09/13/2017    HGB 15.3 09/13/2017    HCT 46.4 09/13/2017    MCV 89 09/13/2017     09/13/2017    INR 0.9 09/12/2017    CHOL 187 09/12/2017    HDL 50 09/12/2017    LDLCALC 105.6 09/12/2017    TRIG 157 (H) 09/12/2017     (H) 09/11/2017       Lipid panel  Lab Results   Component Value Date    CHOL 187 09/12/2017     Lab Results   Component Value Date    HDL 50 09/12/2017     Lab Results   Component Value Date    LDLCALC 105.6 09/12/2017     Lab Results   Component Value Date    TRIG 157 (H) 09/12/2017       Cardiac Studies  Significant Imaging: Echocardiogram:   2D echo with color flow doppler:   Results for orders placed or performed during the hospital encounter of 09/11/17   2D echo with color flow doppler   Result Value Ref Range    QEF 60 55 - 65    Diastolic Dysfunction Yes (A)     Mitral Valve Mobility NORMAL     Tricuspid Valve Regurgitation TRIVIAL     Narrative    Date of Procedure: 09/12/2017        TEST DESCRIPTION       Aorta: The aortic root is normal in size, measuring 3.0 cm at sinotubular junction.     Left Atrium: The left atrial volume index is normal, measuring 31.99 cc/m2.     Left Ventricle: The left ventricle is normal in size, with an end-diastolic diameter of 4.1 cm, and an end-systolic diameter of 3.0 cm. LV wall thickness is normal, with the septum measuring 1.2 cm and the posterior wall measuring 1.1 cm across. Relative   wall thickness was increased at 0.54, and the LV mass index was increased at 108.9 g/m2 consistent with concentric left ventricular hypertrophy. There are no regional wall motion abnormalities. Left ventricular systolic function appears normal. Visually   estimated ejection fraction is 60-65%. The LV Doppler derived stroke volume equals 65.0 ccs.     Diastolic indices: E wave velocity 0.8 m/s, E/A ratio 0.9,  msec., E/e' ratio(lat) 11. Mean left atrial pressures are increased. LVEDP  is estimated to be 15 mmHg. There is diastolic dysfunction secondary to relaxation abnormality.     Right Atrium: The right atrium is normal in size, measuring 4.0 cm in length in the apical view.     Right Ventricle: The right ventricle is normal in size measuring 3.4 cm at the base in the apical right ventricle-focused view. Global right ventricular systolic function appears normal.     Aortic Valve:  The aortic valve is mildly sclerotic with normal leaflet mobility. The aortic valve is tri-leaflet in structure.     Mitral Valve:  The mitral valve is normal in structure with normal leaflet mobility.     Tricuspid Valve:  The tricuspid valve is normal in structure with normal leaflet mobility. There is trivial tricuspid regurgitation.     Pulmonary Valve:  The pulmonic valve is not well seen.     IVC: IVC is normal in size and collapses > 50% with a sniff, suggesting normal right atrial pressure of 3 mmHg.     Atrial Septum: The atrial septum is intact.     Intracavitary: There is no evidence of pericardial effusion, intracavity mass, thrombi, or vegetation.         CONCLUSIONS     1 - Normal left ventricular systolic function (EF 60-65%).     2 - Impaired LV relaxation, increased LVEDP.     3 - Normal right ventricular systolic function .     4 - No wall motion abnormalities.             This document has been electronically    SIGNED BY: Yamilka Bacon MD On: 09/12/2017 14:44    and Transthoracic echo (TTE) complete (Cupid Only): No results found for this or any previous visit.  ECG:  , normal sinus rhythm, diffuse TWI  unchanged from previous tracings.    Cath study : 2017  Diagnostic:          Patient has a right dominant coronary artery.        - Left Main Coronary Artery:             The LM is normal. There is SHIRLEY 3 flow.     - Left Anterior Descending Artery:             The proximal LAD has a 40% stenosis. There is SHIRLEY 3 flow. The remaining portion of the vessel has luminal irregularities (10).     -  D1:             The D1 has luminal irregularities. There is SHIRLEY 3 flow.     - Septal:             The septal has luminal irregularities. There is SHIRLEY 3 flow.     - Left Circumflex Artery:             The mid LCX has a 40% stenosis. There is SHIRLEY 3 flow. The remaining portion of the vessel has luminal irregularities (10).     - Ramus:             The ramus has luminal irregularities. There is SHIRLEY 3 flow.     - Right Coronary Artery:             The proximal RCA has a 30% stenosis. There is SHIRLEY 3 flow.             The distal RCA has a 95% stenosis. There is SHIRLEY 3 flow.                     Lesion Details:   The lesion is ulcerated.     - Posterior Descending Artery:             The ostial PDA has a 30% stenosis. There is SHIRLEY 3 flow. The remaining portion of the vessel has luminal irregularities (10).     - Posterior Lateral Branch:             The PLB has luminal irregularities. There is SHIRLEY 3 flow.      Intervention          Distal RCA:              The lesion was successfully intervened. Post-stenosis of 0% and post-SHIRLEY 3 flow. The vessel was accessed natively.  The following items were used: Blln Trek Rx 2.5 X 15 and Stent Resolute Rx 4.0 X 26 ([place]).     Assessment:     1. Tobacco abuse    2. Malignant hypertensive urgency    3. Atherosclerosis of native coronary artery of native heart without angina pectoris    4. HTN (hypertension), benign        Plan:     1. Malignant HTN  - mostly asymptomatic  - symptomatic with BP controled and HTN medication. Likely getting a lot of symptoms of vasospasm and poor cerebral perfusion with drop of SBP.  - states she is compliant with her home medications but doesn't take them before clinic. Advised to always take her medications before clinic unless advised not too.  - she is will to try a 1/2 tab of spirolactone Tue, Thjina, Sat, Sun and then ful tab MWF.  - will follow up in 3 weeks.  C/w norvasc, coreg, losartan      2. CAD with RCA stent  - stable. C/w  asa/plavix/statin    Continue with current medical plan and lifestyle changes.  Return sooner for concerns or questions. If symptoms persist go to the ED  Total duration of face to face visit time 30 minutes.  Total time spent counseling greater than fifty percent of total visit time.  Counseling included discussion regarding imaging findings, diagnosis, possibilities, treatment options, risks and benefits.      No orders of the defined types were placed in this encounter.      Follow up as scheduled. Return to clinic in 3 weeks. Bp at goal, now trying to help with symptoms  She expressed verbal understanding and agreed with the plan    Thank you for the opportunity to care for this patient. Will be available for questions if needed.     Vivek Benitez MD [facility]  Interventional Cardiology  [facility] [place]  Pager: [phone]

## 2019-12-27 ENCOUNTER — OFFICE VISIT (OUTPATIENT)
Dept: CARDIOLOGY | Facility: CLINIC | Age: 59
End: 2019-12-27
Payer: MEDICAID

## 2019-12-27 VITALS
HEART RATE: 62 BPM | SYSTOLIC BLOOD PRESSURE: 239 MMHG | WEIGHT: 157.63 LBS | BODY MASS INDEX: 27.93 KG/M2 | OXYGEN SATURATION: 97 % | HEIGHT: 63 IN | DIASTOLIC BLOOD PRESSURE: 127 MMHG

## 2019-12-27 DIAGNOSIS — Z72.0 TOBACCO ABUSE: Primary | ICD-10-CM

## 2019-12-27 DIAGNOSIS — I25.10 CORONARY ARTERY DISEASE INVOLVING NATIVE CORONARY ARTERY OF NATIVE HEART WITHOUT ANGINA PECTORIS: ICD-10-CM

## 2019-12-27 DIAGNOSIS — I16.0 HYPERTENSIVE URGENCY: ICD-10-CM

## 2019-12-27 PROCEDURE — 99214 OFFICE O/P EST MOD 30 MIN: CPT | Mod: S$GLB,,, | Performed by: STUDENT IN AN ORGANIZED HEALTH CARE EDUCATION/TRAINING PROGRAM

## 2019-12-27 PROCEDURE — 99214 PR OFFICE/OUTPT VISIT, EST, LEVL IV, 30-39 MIN: ICD-10-PCS | Mod: S$GLB,,, | Performed by: STUDENT IN AN ORGANIZED HEALTH CARE EDUCATION/TRAINING PROGRAM

## 2019-12-27 RX ORDER — HYDRALAZINE HYDROCHLORIDE 25 MG/1
25 TABLET, FILM COATED ORAL EVERY 12 HOURS
Qty: 60 TABLET | Refills: 5 | Status: SHIPPED | OUTPATIENT
Start: 2019-12-27 | End: 2020-01-31

## 2019-12-27 NOTE — PROGRESS NOTES
"   Cardiology Clinic note    Subjective:   Patient ID:  Yuval Fonseca is a 59 y.o. female who presents for follow-up of uncontrolled HTN    HPI:   Yuval Fonseca  has a past medical history of Hypertension.    11/15: Here for follow up of uncontrolled HTN. She had hx of RCA stent in 2017 for uncontrolled HTN/CP. Pt is doing well overall. Pt notes for the past 2 months, left chest burning sensation before bed. Feels like stretching sensation in her left side of her chest. Sometimes improves with tylenol. intermittment  She doesn't have symptoms when she is active. Only feels it the most when she is relaxed. She is able to fish w/o limitations. Takes care of her grandchildren w/o issues.  Notes some atypical HA but ongoing for several months.    Pt is very hesitant to take any more BP medications. She states the last time she was taking more BP she passed out. She thinks she is better when her SBP > 200. Didn't take am medications. Pt is not willing to go to the ER. Pt is agreeable to try low dose medications with close follow up. Will get a home bp cuff and make a log.    12/6/19: Here for follow up BP. She has been compliant with her home norvasc, coreg, losartan. She notes more HA, fatigue - daytime sleepiness with spirolactone. Stopped the medication 3 days to confirm the spirolactone was causing her issues. She took this am.  Bp drastically better today. Goal SBP was 180's from previously 250.     12/27/19: Here for follow up of BP. She has been compliant with her home norvasc, coreg, losartan. At last visit convinced pt to take 1/2 spirolactone daily. She tried for a few more days and then noted her ADR we to much to bear and stopped the medication. She feels fine today even with SBP as it is.    Vitals  Vitals:    12/27/19 1107   BP: (!) 239/127   Pulse: 62   SpO2: 97%   Weight: 71.5 kg (157 lb 9.6 oz)   Height: 5' 3" (1.6 m)       Patient Active Problem List    Diagnosis Date Noted    HTN (hypertension), " benign 02/05/2018    Hypertensive urgency 12/01/2017    S/P primary angioplasty with coronary stent 09/25/2017    Coronary artery disease involving native coronary artery of native heart without angina pectoris 09/25/2017    Atherosclerotic heart disease of native coronary artery without angina pectoris 09/13/2017    Unstable angina 09/11/2017    Malignant hypertensive urgency 09/11/2017    Tobacco abuse 09/11/2017       Patient's Medications   New Prescriptions    No medications on file   Previous Medications    ALPRAZOLAM (XANAX) 1 MG TABLET    TAKE ONE TABLET BY MOUTH EVERY DAY FOR THREE DAYS    AMLODIPINE (NORVASC) 10 MG TABLET    Take 1 tablet (10 mg total) by mouth once daily.    ASPIRIN (ECOTRIN) 81 MG EC TABLET    Take 1 tablet (81 mg total) by mouth once daily.    ATORVASTATIN (LIPITOR) 80 MG TABLET    Take 1 tablet (80 mg total) by mouth once daily.    BLOOD PRESSURE TEST KIT-MEDIUM KIT    1 Units by Misc.(Non-Drug; Combo Route) route once daily.    CARVEDILOL (COREG) 25 MG TABLET    Take 1 tablet (25 mg total) by mouth 2 (two) times daily with meals.    CLOPIDOGREL (PLAVIX) 75 MG TABLET    Take 1 tablet (75 mg total) by mouth once daily.    FLUCONAZOLE (DIFLUCAN) 200 MG TAB    Take 200 mg by mouth once daily.     LOSARTAN (COZAAR) 100 MG TABLET    Take 1 tablet (100 mg total) by mouth once daily.    NEOMYCIN-POLYMYXIN-HYDROCORTISONE (CORTISPORIN) OTIC SOLUTION    3 drops 3 (three) times daily.    NICOTINE (NICODERM CQ) 21 MG/24 HR    Place 1 patch onto the skin once daily.    NITROGLYCERIN (NITROSTAT) 0.4 MG SL TABLET    Place 1 tablet (0.4 mg total) under the tongue every 5 (five) minutes as needed for Chest pain.    RANITIDINE (ZANTAC) 150 MG TABLET    Take 150 mg by mouth 2 (two) times daily.    TRIAMCINOLONE ACETONIDE 0.025% (KENALOG) 0.025 % CREAM    Apply topically 2 (two) times daily.   Modified Medications    No medications on file   Discontinued Medications    SPIRONOLACTONE (ALDACTONE) 25  MG TABLET    Take 0.5 tablets (12.5 mg total) by mouth once daily.         Review of Systems   Constitution: Positive for malaise/fatigue. Negative for chills, decreased appetite and weight gain.   HENT: Negative for congestion and ear discharge.    Eyes: Positive for blurred vision. Negative for double vision.   Cardiovascular: Negative for chest pain, cyanosis, dyspnea on exertion, irregular heartbeat, leg swelling, near-syncope, orthopnea, palpitations and syncope.   Respiratory: Negative for cough, shortness of breath and sleep disturbances due to breathing.    Skin: Negative for color change and dry skin.   Musculoskeletal: Negative for joint pain, joint swelling and muscle cramps.   Gastrointestinal: Negative for bloating, heartburn, hematemesis and hematochezia.   Genitourinary: Negative for bladder incontinence and dysuria.   Neurological: Positive for headaches. Negative for aphonia, excessive daytime sleepiness, dizziness, focal weakness, light-headedness, loss of balance and weakness.   Psychiatric/Behavioral: Negative for altered mental status, depression and memory loss. The patient does not have insomnia and is not nervous/anxious.          Objective:   Physical Exam   Constitutional: She is oriented to person, place, and time. She appears well-developed and well-nourished.   HENT:   Head: Normocephalic and atraumatic.   Eyes: Conjunctivae and EOM are normal.   Neck: Normal range of motion. Neck supple. No JVD present.   Cardiovascular: Normal rate, regular rhythm and normal heart sounds.   No murmur heard.  Pulmonary/Chest: Effort normal and breath sounds normal. No respiratory distress. She has no wheezes. She has no rales.   Abdominal: Soft. Bowel sounds are normal. She exhibits no distension.   Musculoskeletal: Normal range of motion. She exhibits no edema.   Neurological: She is alert and oriented to person, place, and time.   Skin: Skin is warm and dry. No erythema.   Psychiatric: She has a  normal mood and affect. Her behavior is normal. Judgment and thought content normal.   Nursing note and vitals reviewed.      Lab Results    Lab Results   Component Value Date     09/13/2017    K 3.4 (L) 09/13/2017     09/13/2017    CO2 24 09/13/2017    BUN 14 09/13/2017    CREATININE 0.7 09/13/2017     09/13/2017    WBC 9.45 09/13/2017    HGB 15.3 09/13/2017    HCT 46.4 09/13/2017    MCV 89 09/13/2017     09/13/2017    INR 0.9 09/12/2017    CHOL 187 09/12/2017    HDL 50 09/12/2017    LDLCALC 105.6 09/12/2017    TRIG 157 (H) 09/12/2017     (H) 09/11/2017       Lipid panel  Lab Results   Component Value Date    CHOL 187 09/12/2017     Lab Results   Component Value Date    HDL 50 09/12/2017     Lab Results   Component Value Date    LDLCALC 105.6 09/12/2017     Lab Results   Component Value Date    TRIG 157 (H) 09/12/2017       Cardiac Studies  Significant Imaging: Echocardiogram:   2D echo with color flow doppler:   Results for orders placed or performed during the hospital encounter of 09/11/17   2D echo with color flow doppler   Result Value Ref Range    QEF 60 55 - 65    Diastolic Dysfunction Yes (A)     Mitral Valve Mobility NORMAL     Tricuspid Valve Regurgitation TRIVIAL     Narrative    Date of Procedure: 09/12/2017        TEST DESCRIPTION       Aorta: The aortic root is normal in size, measuring 3.0 cm at sinotubular junction.     Left Atrium: The left atrial volume index is normal, measuring 31.99 cc/m2.     Left Ventricle: The left ventricle is normal in size, with an end-diastolic diameter of 4.1 cm, and an end-systolic diameter of 3.0 cm. LV wall thickness is normal, with the septum measuring 1.2 cm and the posterior wall measuring 1.1 cm across. Relative   wall thickness was increased at 0.54, and the LV mass index was increased at 108.9 g/m2 consistent with concentric left ventricular hypertrophy. There are no regional wall motion abnormalities. Left ventricular systolic  function appears normal. Visually   estimated ejection fraction is 60-65%. The LV Doppler derived stroke volume equals 65.0 ccs.     Diastolic indices: E wave velocity 0.8 m/s, E/A ratio 0.9,  msec., E/e' ratio(lat) 11. Mean left atrial pressures are increased. LVEDP is estimated to be 15 mmHg. There is diastolic dysfunction secondary to relaxation abnormality.     Right Atrium: The right atrium is normal in size, measuring 4.0 cm in length in the apical view.     Right Ventricle: The right ventricle is normal in size measuring 3.4 cm at the base in the apical right ventricle-focused view. Global right ventricular systolic function appears normal.     Aortic Valve:  The aortic valve is mildly sclerotic with normal leaflet mobility. The aortic valve is tri-leaflet in structure.     Mitral Valve:  The mitral valve is normal in structure with normal leaflet mobility.     Tricuspid Valve:  The tricuspid valve is normal in structure with normal leaflet mobility. There is trivial tricuspid regurgitation.     Pulmonary Valve:  The pulmonic valve is not well seen.     IVC: IVC is normal in size and collapses > 50% with a sniff, suggesting normal right atrial pressure of 3 mmHg.     Atrial Septum: The atrial septum is intact.     Intracavitary: There is no evidence of pericardial effusion, intracavity mass, thrombi, or vegetation.         CONCLUSIONS     1 - Normal left ventricular systolic function (EF 60-65%).     2 - Impaired LV relaxation, increased LVEDP.     3 - Normal right ventricular systolic function .     4 - No wall motion abnormalities.             This document has been electronically    SIGNED BY: Yamilka Bacon MD On: 09/12/2017 14:44    and Transthoracic echo (TTE) complete (Cupid Only): No results found for this or any previous visit.  ECG:  , normal sinus rhythm, diffuse TWI  unchanged from previous tracings.    Cath study : 2017  Diagnostic:          Patient has a right dominant coronary artery.         - Left Main Coronary Artery:             The LM is normal. There is SHIRLEY 3 flow.     - Left Anterior Descending Artery:             The proximal LAD has a 40% stenosis. There is SHIRLEY 3 flow. The remaining portion of the vessel has luminal irregularities (10).     - D1:             The D1 has luminal irregularities. There is SHIRLEY 3 flow.     - Septal:             The septal has luminal irregularities. There is SHIRLEY 3 flow.     - Left Circumflex Artery:             The mid LCX has a 40% stenosis. There is SHIRLEY 3 flow. The remaining portion of the vessel has luminal irregularities (10).     - Ramus:             The ramus has luminal irregularities. There is SHIRLEY 3 flow.     - Right Coronary Artery:             The proximal RCA has a 30% stenosis. There is SHIRLEY 3 flow.             The distal RCA has a 95% stenosis. There is SHIRLEY 3 flow.                     Lesion Details:   The lesion is ulcerated.     - Posterior Descending Artery:             The ostial PDA has a 30% stenosis. There is SHIRLEY 3 flow. The remaining portion of the vessel has luminal irregularities (10).     - Posterior Lateral Branch:             The PLB has luminal irregularities. There is SHIRLEY 3 flow.      Intervention          Distal RCA:              The lesion was successfully intervened. Post-stenosis of 0% and post-SHIRLEY 3 flow. The vessel was accessed natively.  The following items were used: Blln Trek Rx 2.5 X 15 and Stent Resolute Rx 4.0 X 26 ([facility]).     Assessment:     1. Tobacco abuse    2. Coronary artery disease involving native coronary artery of native heart without angina pectoris    3. Hypertensive urgency        Plan:     1. Malignant HTN  - mostly asymptomatic  - symptomatic with BP controled and HTN medication. Likely getting a lot of symptoms of vasospasm and poor cerebral perfusion with drop of SBP.  - states she is compliant with her home medications. Advised to always take her medications before clinic unless advised not  too.  - intolerant to spirolactone  - C/w norvasc, coreg, losartan  - will add low dose hydralazine 25mg BID. Likely will need a much higher dose but need pt demonstrate compliance with the medication before titration up.   - tried to enroll pt in digital HTN clinic but pt didn't like the Rn and now refuses HTN clinic    2. CAD with RCA stent  - stable. C/w asa/plavix/statin    3. Tobacco use  - spent 3-10 minutes discussing the risk and benefits of smoking cessation. Patient is not ready to quit at this time.  - Ready to quit: No  Counseling given: Yes    Continue with current medical plan and lifestyle changes.  Return sooner for concerns or questions. If symptoms persist go to the ED  Total duration of face to face visit time 30 minutes.  Total time spent counseling greater than fifty percent of total visit time.  Counseling included discussion regarding imaging findings, diagnosis, possibilities, treatment options, risks and benefits.      No orders of the defined types were placed in this encounter.      Follow up as scheduled. Return to clinic in 1 month, Bp check   She expressed verbal understanding and agreed with the plan    Thank you for the opportunity to care for this patient. Will be available for questions if needed.     Vivek Benitez MD [facility]  Interventional Cardiology  [facility] [place]  Pager: [phone]

## 2020-01-31 ENCOUNTER — OFFICE VISIT (OUTPATIENT)
Dept: CARDIOLOGY | Facility: CLINIC | Age: 60
End: 2020-01-31
Payer: MEDICAID

## 2020-01-31 VITALS
DIASTOLIC BLOOD PRESSURE: 103 MMHG | HEIGHT: 63 IN | BODY MASS INDEX: 27.94 KG/M2 | WEIGHT: 157.69 LBS | HEART RATE: 77 BPM | OXYGEN SATURATION: 77 % | SYSTOLIC BLOOD PRESSURE: 215 MMHG

## 2020-01-31 DIAGNOSIS — Z95.5 S/P PRIMARY ANGIOPLASTY WITH CORONARY STENT: ICD-10-CM

## 2020-01-31 DIAGNOSIS — Z72.0 TOBACCO ABUSE: ICD-10-CM

## 2020-01-31 DIAGNOSIS — I10 HTN (HYPERTENSION), BENIGN: Primary | ICD-10-CM

## 2020-01-31 DIAGNOSIS — I16.0 HYPERTENSIVE URGENCY: ICD-10-CM

## 2020-01-31 PROCEDURE — 99214 OFFICE O/P EST MOD 30 MIN: CPT | Mod: S$GLB,,, | Performed by: STUDENT IN AN ORGANIZED HEALTH CARE EDUCATION/TRAINING PROGRAM

## 2020-01-31 PROCEDURE — 99214 PR OFFICE/OUTPT VISIT, EST, LEVL IV, 30-39 MIN: ICD-10-PCS | Mod: S$GLB,,, | Performed by: STUDENT IN AN ORGANIZED HEALTH CARE EDUCATION/TRAINING PROGRAM

## 2020-01-31 RX ORDER — IBUPROFEN 200 MG
1 TABLET ORAL DAILY
Qty: 30 PATCH | Refills: 5 | Status: SHIPPED | OUTPATIENT
Start: 2020-01-31

## 2020-01-31 RX ORDER — MINOXIDIL 2.5 MG/1
2.5 TABLET ORAL DAILY
Qty: 30 TABLET | Refills: 5 | Status: SHIPPED | OUTPATIENT
Start: 2020-01-31 | End: 2020-03-06

## 2020-01-31 RX ORDER — HYDRALAZINE HYDROCHLORIDE 50 MG/1
50 TABLET, FILM COATED ORAL EVERY 12 HOURS
Qty: 60 TABLET | Refills: 5 | Status: SHIPPED | OUTPATIENT
Start: 2020-01-31 | End: 2020-03-06 | Stop reason: SDUPTHER

## 2020-01-31 NOTE — PROGRESS NOTES
Cardiology Clinic note    Subjective:   Patient ID:  Yuval Fonseca is a 59 y.o. female who presents for follow-up of uncontrolled HTN    HPI:   Yuval Fonseca  has a past medical history of Hypertension.    11/15: Here for follow up of uncontrolled HTN. She had hx of RCA stent in 2017 for uncontrolled HTN/CP. Pt is doing well overall. Pt notes for the past 2 months, left chest burning sensation before bed. Feels like stretching sensation in her left side of her chest. Sometimes improves with tylenol. intermittment  She doesn't have symptoms when she is active. Only feels it the most when she is relaxed. She is able to fish w/o limitations. Takes care of her grandchildren w/o issues.  Notes some atypical HA but ongoing for several months.    Pt is very hesitant to take any more BP medications. She states the last time she was taking more BP she passed out. She thinks she is better when her SBP > 200. Didn't take am medications. Pt is not willing to go to the ER. Pt is agreeable to try low dose medications with close follow up. Will get a home bp cuff and make a log.    12/6/19: Here for follow up BP. She has been compliant with her home norvasc, coreg, losartan. She notes more HA, fatigue - daytime sleepiness with spirolactone. Stopped the medication 3 days to confirm the spirolactone was causing her issues. She took this am.  Bp drastically better today. Goal SBP was 180's from previously 250.     12/27/19: Here for follow up of BP. She has been compliant with her home norvasc, coreg, losartan. At last visit convinced pt to take 1/2 spirolactone daily. She tried for a few more days and then noted her ADR we to much to bear and stopped the medication. She feels fine today even with SBP as it is.    1/31/2020: Here for 1 month follow up BP.  She has been compliant with her home norvasc, coreg, losartan. Intolerant to spirolactone. Now on hydralazine 25mg BID which isnt too bad for her but sometimes makes her  "feel sluggish. No recent ER visits. Smoking 1 PPD    Vitals  Vitals:    01/31/20 1047   BP: (!) 215/103   Pulse: 77   SpO2: (!) 77%   Weight: 71.5 kg (157 lb 11.2 oz)   Height: 5' 3" (1.6 m)       Patient Active Problem List    Diagnosis Date Noted    HTN (hypertension), benign 02/05/2018    Hypertensive urgency 12/01/2017    S/P primary angioplasty with coronary stent 09/25/2017    Coronary artery disease involving native coronary artery of native heart without angina pectoris 09/25/2017    Atherosclerotic heart disease of native coronary artery without angina pectoris 09/13/2017    Unstable angina 09/11/2017    Malignant hypertensive urgency 09/11/2017    Tobacco abuse 09/11/2017       Patient's Medications   New Prescriptions    No medications on file   Previous Medications    ALPRAZOLAM (XANAX) 1 MG TABLET    TAKE ONE TABLET BY MOUTH EVERY DAY FOR THREE DAYS    AMLODIPINE (NORVASC) 10 MG TABLET    Take 1 tablet (10 mg total) by mouth once daily.    ASPIRIN (ECOTRIN) 81 MG EC TABLET    Take 1 tablet (81 mg total) by mouth once daily.    ATORVASTATIN (LIPITOR) 80 MG TABLET    Take 1 tablet (80 mg total) by mouth once daily.    BLOOD PRESSURE TEST KIT-MEDIUM KIT    1 Units by Misc.(Non-Drug; Combo Route) route once daily.    CARVEDILOL (COREG) 25 MG TABLET    Take 1 tablet (25 mg total) by mouth 2 (two) times daily with meals.    CLOPIDOGREL (PLAVIX) 75 MG TABLET    Take 1 tablet (75 mg total) by mouth once daily.    FLUCONAZOLE (DIFLUCAN) 200 MG TAB    Take 200 mg by mouth once daily.     HYDRALAZINE (APRESOLINE) 25 MG TABLET    Take 1 tablet (25 mg total) by mouth every 12 (twelve) hours.    LOSARTAN (COZAAR) 100 MG TABLET    Take 1 tablet (100 mg total) by mouth once daily.    NEOMYCIN-POLYMYXIN-HYDROCORTISONE (CORTISPORIN) OTIC SOLUTION    3 drops 3 (three) times daily.    NICOTINE (NICODERM CQ) 21 MG/24 HR    Place 1 patch onto the skin once daily.    NITROGLYCERIN (NITROSTAT) 0.4 MG SL TABLET    Place " 1 tablet (0.4 mg total) under the tongue every 5 (five) minutes as needed for Chest pain.    RANITIDINE (ZANTAC) 150 MG TABLET    Take 150 mg by mouth 2 (two) times daily.    TRIAMCINOLONE ACETONIDE 0.025% (KENALOG) 0.025 % CREAM    Apply topically 2 (two) times daily.   Modified Medications    No medications on file   Discontinued Medications    No medications on file         Review of Systems   Constitution: Positive for malaise/fatigue. Negative for chills, decreased appetite and weight gain.   HENT: Negative for congestion and ear discharge.    Eyes: Positive for blurred vision. Negative for double vision.   Cardiovascular: Negative for chest pain, cyanosis, dyspnea on exertion, irregular heartbeat, leg swelling, near-syncope, orthopnea, palpitations and syncope.   Respiratory: Negative for cough, shortness of breath and sleep disturbances due to breathing.    Skin: Negative for color change and dry skin.   Musculoskeletal: Negative for joint pain, joint swelling and muscle cramps.   Gastrointestinal: Negative for bloating, heartburn, hematemesis and hematochezia.   Genitourinary: Negative for bladder incontinence and dysuria.   Neurological: Positive for headaches. Negative for aphonia, excessive daytime sleepiness, dizziness, focal weakness, light-headedness, loss of balance and weakness.   Psychiatric/Behavioral: Negative for altered mental status, depression and memory loss. The patient does not have insomnia and is not nervous/anxious.          Objective:   Physical Exam   Constitutional: She is oriented to person, place, and time. She appears well-developed and well-nourished.   HENT:   Head: Normocephalic and atraumatic.   Eyes: Conjunctivae and EOM are normal.   Neck: Normal range of motion. Neck supple. No JVD present.   Cardiovascular: Normal rate, regular rhythm and normal heart sounds.   No murmur heard.  Pulmonary/Chest: Effort normal and breath sounds normal. No respiratory distress. She has no  wheezes. She has no rales.   Abdominal: Soft. Bowel sounds are normal. She exhibits no distension.   Musculoskeletal: Normal range of motion. She exhibits no edema.   Neurological: She is alert and oriented to person, place, and time.   Skin: Skin is warm and dry. No erythema.   Psychiatric: She has a normal mood and affect. Her behavior is normal. Judgment and thought content normal.   Nursing note and vitals reviewed.      Lab Results    Lab Results   Component Value Date     09/13/2017    K 3.4 (L) 09/13/2017     09/13/2017    CO2 24 09/13/2017    BUN 14 09/13/2017    CREATININE 0.7 09/13/2017     09/13/2017    WBC 9.45 09/13/2017    HGB 15.3 09/13/2017    HCT 46.4 09/13/2017    MCV 89 09/13/2017     09/13/2017    INR 0.9 09/12/2017    CHOL 187 09/12/2017    HDL 50 09/12/2017    LDLCALC 105.6 09/12/2017    TRIG 157 (H) 09/12/2017     (H) 09/11/2017       Lipid panel  Lab Results   Component Value Date    CHOL 187 09/12/2017     Lab Results   Component Value Date    HDL 50 09/12/2017     Lab Results   Component Value Date    LDLCALC 105.6 09/12/2017     Lab Results   Component Value Date    TRIG 157 (H) 09/12/2017       Cardiac Studies  Significant Imaging: Echocardiogram:   2D echo with color flow doppler:   Results for orders placed or performed during the hospital encounter of 09/11/17   2D echo with color flow doppler   Result Value Ref Range    QEF 60 55 - 65    Diastolic Dysfunction Yes (A)     Mitral Valve Mobility NORMAL     Tricuspid Valve Regurgitation TRIVIAL     Narrative    Date of Procedure: 09/12/2017        TEST DESCRIPTION       Aorta: The aortic root is normal in size, measuring 3.0 cm at sinotubular junction.     Left Atrium: The left atrial volume index is normal, measuring 31.99 cc/m2.     Left Ventricle: The left ventricle is normal in size, with an end-diastolic diameter of 4.1 cm, and an end-systolic diameter of 3.0 cm. LV wall thickness is normal, with the  septum measuring 1.2 cm and the posterior wall measuring 1.1 cm across. Relative   wall thickness was increased at 0.54, and the LV mass index was increased at 108.9 g/m2 consistent with concentric left ventricular hypertrophy. There are no regional wall motion abnormalities. Left ventricular systolic function appears normal. Visually   estimated ejection fraction is 60-65%. The LV Doppler derived stroke volume equals 65.0 ccs.     Diastolic indices: E wave velocity 0.8 m/s, E/A ratio 0.9,  msec., E/e' ratio(lat) 11. Mean left atrial pressures are increased. LVEDP is estimated to be 15 mmHg. There is diastolic dysfunction secondary to relaxation abnormality.     Right Atrium: The right atrium is normal in size, measuring 4.0 cm in length in the apical view.     Right Ventricle: The right ventricle is normal in size measuring 3.4 cm at the base in the apical right ventricle-focused view. Global right ventricular systolic function appears normal.     Aortic Valve:  The aortic valve is mildly sclerotic with normal leaflet mobility. The aortic valve is tri-leaflet in structure.     Mitral Valve:  The mitral valve is normal in structure with normal leaflet mobility.     Tricuspid Valve:  The tricuspid valve is normal in structure with normal leaflet mobility. There is trivial tricuspid regurgitation.     Pulmonary Valve:  The pulmonic valve is not well seen.     IVC: IVC is normal in size and collapses > 50% with a sniff, suggesting normal right atrial pressure of 3 mmHg.     Atrial Septum: The atrial septum is intact.     Intracavitary: There is no evidence of pericardial effusion, intracavity mass, thrombi, or vegetation.         CONCLUSIONS     1 - Normal left ventricular systolic function (EF 60-65%).     2 - Impaired LV relaxation, increased LVEDP.     3 - Normal right ventricular systolic function .     4 - No wall motion abnormalities.             This document has been electronically    SIGNED BY: Yamilka  MD Jordi On: 09/12/2017 14:44    and Transthoracic echo (TTE) complete (Cupid Only): No results found for this or any previous visit.  ECG:  , normal sinus rhythm, diffuse TWI  unchanged from previous tracings.    Cath study : 2017  Diagnostic:          Patient has a right dominant coronary artery.        - Left Main Coronary Artery:             The LM is normal. There is SHIRLEY 3 flow.     - Left Anterior Descending Artery:             The proximal LAD has a 40% stenosis. There is SHIRLEY 3 flow. The remaining portion of the vessel has luminal irregularities (10).     - D1:             The D1 has luminal irregularities. There is SHIRLEY 3 flow.     - Septal:             The septal has luminal irregularities. There is SHIRLEY 3 flow.     - Left Circumflex Artery:             The mid LCX has a 40% stenosis. There is SHIRLEY 3 flow. The remaining portion of the vessel has luminal irregularities (10).     - Ramus:             The ramus has luminal irregularities. There is SHIRLEY 3 flow.     - Right Coronary Artery:             The proximal RCA has a 30% stenosis. There is SHIRLEY 3 flow.             The distal RCA has a 95% stenosis. There is SHIRLEY 3 flow.                     Lesion Details:   The lesion is ulcerated.     - Posterior Descending Artery:             The ostial PDA has a 30% stenosis. There is SHIRLEY 3 flow. The remaining portion of the vessel has luminal irregularities (10).     - Posterior Lateral Branch:             The PLB has luminal irregularities. There is SHIRLEY 3 flow.      Intervention          Distal RCA:              The lesion was successfully intervened. Post-stenosis of 0% and post-SHIRLEY 3 flow. The vessel was accessed natively.  The following items were used: Blln Trek Rx 2.5 X 15 and Stent Resolute Rx 4.0 X 26 ([facility]).     Assessment:     No diagnosis found.    Plan:     1. Malignant HTN  - symptomatic with BP controled and HTN medication. Likely getting a lot of symptoms of vasospasm and poor cerebral perfusion  with drop of SBP.  - states she is semi-compliant with her home medications.may miss a dose here and there  - intolerant to spirolactone  - C/w norvasc, coreg, losartan  -c/w hydralazine, will increase to 50mg BID. Likely will need a much higher dose but need pt demonstrate compliance with the medication before titration up.   - will also add minoxidil 2.5mg and see if that helps  - tried to enroll pt in digital HTN clinic but pt didn't like the Rn and now refuses HTN clinic    2. CAD with RCA stent  - stable. C/w asa/plavix/statin    3. Tobacco use  - spent 3-10 minutes discussing the risk and benefits of smoking cessation. Patient is not ready to quit at this time.  - Ready to quit: No  - wants to try nicoderm patches again  Counseling given: Yes    Continue with current medical plan and lifestyle changes.  Return sooner for concerns or questions. If symptoms persist go to the ED  Total duration of face to face visit time 30 minutes.  Total time spent counseling greater than fifty percent of total visit time.  Counseling included discussion regarding imaging findings, diagnosis, possibilities, treatment options, risks and benefits.      No orders of the defined types were placed in this encounter.      Follow up as scheduled. Return to clinic in 1 month, Bp check   She expressed verbal understanding and agreed with the plan    Thank you for the opportunity to care for this patient. Will be available for questions if needed.     Vivek Benitez MD [facility]  Interventional Cardiology  [facility] [place]  Pager: [phone]

## 2020-03-06 ENCOUNTER — OFFICE VISIT (OUTPATIENT)
Dept: CARDIOLOGY | Facility: CLINIC | Age: 60
End: 2020-03-06
Payer: MEDICAID

## 2020-03-06 VITALS
DIASTOLIC BLOOD PRESSURE: 124 MMHG | HEIGHT: 63 IN | BODY MASS INDEX: 28.53 KG/M2 | HEART RATE: 69 BPM | OXYGEN SATURATION: 95 % | SYSTOLIC BLOOD PRESSURE: 242 MMHG | WEIGHT: 161 LBS

## 2020-03-06 DIAGNOSIS — I25.10 CORONARY ARTERY DISEASE INVOLVING NATIVE CORONARY ARTERY OF NATIVE HEART WITHOUT ANGINA PECTORIS: Primary | ICD-10-CM

## 2020-03-06 DIAGNOSIS — I16.0 HYPERTENSIVE URGENCY: ICD-10-CM

## 2020-03-06 DIAGNOSIS — Z72.0 TOBACCO ABUSE: ICD-10-CM

## 2020-03-06 DIAGNOSIS — I16.0 MALIGNANT HYPERTENSIVE URGENCY: ICD-10-CM

## 2020-03-06 PROCEDURE — 99214 OFFICE O/P EST MOD 30 MIN: CPT | Mod: S$GLB,,, | Performed by: STUDENT IN AN ORGANIZED HEALTH CARE EDUCATION/TRAINING PROGRAM

## 2020-03-06 PROCEDURE — 99214 PR OFFICE/OUTPT VISIT, EST, LEVL IV, 30-39 MIN: ICD-10-PCS | Mod: S$GLB,,, | Performed by: STUDENT IN AN ORGANIZED HEALTH CARE EDUCATION/TRAINING PROGRAM

## 2020-03-06 RX ORDER — SPIRONOLACTONE 25 MG/1
12.5 TABLET ORAL DAILY
Qty: 15 TABLET | Refills: 11 | Status: SHIPPED | OUTPATIENT
Start: 2020-03-06 | End: 2021-03-06

## 2020-03-06 RX ORDER — HYDRALAZINE HYDROCHLORIDE 100 MG/1
100 TABLET, FILM COATED ORAL EVERY 12 HOURS
Qty: 60 TABLET | Refills: 5 | Status: SHIPPED | OUTPATIENT
Start: 2020-03-06 | End: 2021-03-06

## 2020-03-06 NOTE — PROGRESS NOTES
Cardiology Clinic note    Subjective:   Patient ID:  Yuval Fonseca is a 59 y.o. female who presents for follow-up of uncontrolled HTN    HPI:   Yuval Fonseca  has a past medical history of Hypertension.    11/15: Here for follow up of uncontrolled HTN. She had hx of RCA stent in 2017 for uncontrolled HTN/CP. Pt is doing well overall. Pt notes for the past 2 months, left chest burning sensation before bed. Feels like stretching sensation in her left side of her chest. Sometimes improves with tylenol. intermittment  She doesn't have symptoms when she is active. Only feels it the most when she is relaxed. She is able to fish w/o limitations. Takes care of her grandchildren w/o issues.  Notes some atypical HA but ongoing for several months.    Pt is very hesitant to take any more BP medications. She states the last time she was taking more BP she passed out. She thinks she is better when her SBP > 200. Didn't take am medications. Pt is not willing to go to the ER. Pt is agreeable to try low dose medications with close follow up. Will get a home bp cuff and make a log.    12/6/19: Here for follow up BP. She has been compliant with her home norvasc, coreg, losartan. She notes more HA, fatigue - daytime sleepiness with spirolactone. Stopped the medication 3 days to confirm the spirolactone was causing her issues. She took this am.  Bp drastically better today. Goal SBP was 180's from previously 250.     12/27/19: Here for follow up of BP. She has been compliant with her home norvasc, coreg, losartan. At last visit convinced pt to take 1/2 spirolactone daily. She tried for a few more days and then noted her ADR we to much to bear and stopped the medication. She feels fine today even with SBP as it is.    1/31/2020: Here for 1 month follow up BP.  She has been compliant with her home norvasc, coreg, losartan. Intolerant to spirolactone. Now on hydralazine 25mg BID which isnt too bad for her but sometimes makes her  "feel sluggish. No recent ER visits. Smoking 1 PPD    3/6/2020: Here for 1 month follow up BP.  She has been compliant with her home norvasc, coreg, losartan. Intolerant to minoxidil 2.5. States she fell twice with the medication. Makes her feel drunk. Stopped the nicotine patches. States she will not stop smoking.      Vitals  Vitals:    03/06/20 0907   BP: (!) 242/124   Pulse: 69   SpO2: 95%   Weight: 73 kg (161 lb)   Height: 5' 3" (1.6 m)       Patient Active Problem List    Diagnosis Date Noted    HTN (hypertension), benign 02/05/2018    Hypertensive urgency 12/01/2017    S/P primary angioplasty with coronary stent 09/25/2017    Coronary artery disease involving native coronary artery of native heart without angina pectoris 09/25/2017    Atherosclerotic heart disease of native coronary artery without angina pectoris 09/13/2017    Unstable angina 09/11/2017    Malignant hypertensive urgency 09/11/2017    Tobacco abuse 09/11/2017       Patient's Medications   New Prescriptions    SPIRONOLACTONE (ALDACTONE) 25 MG TABLET    Take 0.5 tablets (12.5 mg total) by mouth once daily.   Previous Medications    ALPRAZOLAM (XANAX) 1 MG TABLET    TAKE ONE TABLET BY MOUTH EVERY DAY FOR THREE DAYS    AMLODIPINE (NORVASC) 10 MG TABLET    Take 1 tablet (10 mg total) by mouth once daily.    ASPIRIN (ECOTRIN) 81 MG EC TABLET    Take 1 tablet (81 mg total) by mouth once daily.    ATORVASTATIN (LIPITOR) 80 MG TABLET    Take 1 tablet (80 mg total) by mouth once daily.    BLOOD PRESSURE TEST KIT-MEDIUM KIT    1 Units by Misc.(Non-Drug; Combo Route) route once daily.    CARVEDILOL (COREG) 25 MG TABLET    Take 1 tablet (25 mg total) by mouth 2 (two) times daily with meals.    CLOPIDOGREL (PLAVIX) 75 MG TABLET    Take 1 tablet (75 mg total) by mouth once daily.    FLUCONAZOLE (DIFLUCAN) 200 MG TAB    Take 200 mg by mouth once daily.     LOSARTAN (COZAAR) 100 MG TABLET    Take 1 tablet (100 mg total) by mouth once daily.    " NEOMYCIN-POLYMYXIN-HYDROCORTISONE (CORTISPORIN) OTIC SOLUTION    3 drops 3 (three) times daily.    NICOTINE (NICODERM CQ) 14 MG/24 HR    Place 1 patch onto the skin once daily.    NITROGLYCERIN (NITROSTAT) 0.4 MG SL TABLET    Place 1 tablet (0.4 mg total) under the tongue every 5 (five) minutes as needed for Chest pain.    RANITIDINE (ZANTAC) 150 MG TABLET    Take 150 mg by mouth 2 (two) times daily.    TRIAMCINOLONE ACETONIDE 0.025% (KENALOG) 0.025 % CREAM    Apply topically 2 (two) times daily.   Modified Medications    Modified Medication Previous Medication    HYDRALAZINE (APRESOLINE) 100 MG TABLET hydrALAZINE (APRESOLINE) 50 MG tablet       Take 1 tablet (100 mg total) by mouth every 12 (twelve) hours.    Take 1 tablet (50 mg total) by mouth every 12 (twelve) hours.   Discontinued Medications    MINOXIDIL (LONITEN) 2.5 MG TABLET    Take 1 tablet (2.5 mg total) by mouth once daily.         Review of Systems   Constitution: Negative for chills, decreased appetite, malaise/fatigue and weight gain.   HENT: Negative for congestion and ear discharge.    Eyes: Positive for blurred vision. Negative for double vision.   Cardiovascular: Negative for chest pain, cyanosis, dyspnea on exertion, irregular heartbeat, leg swelling, near-syncope, orthopnea, palpitations and syncope.   Respiratory: Negative for cough, shortness of breath and sleep disturbances due to breathing.    Skin: Negative for color change and dry skin.   Musculoskeletal: Negative for joint pain, joint swelling and muscle cramps.   Gastrointestinal: Negative for bloating, heartburn, hematemesis and hematochezia.   Genitourinary: Negative for bladder incontinence and dysuria.   Neurological: Positive for dizziness and headaches. Negative for aphonia, excessive daytime sleepiness, focal weakness, light-headedness, loss of balance and weakness.   Psychiatric/Behavioral: Negative for altered mental status, depression and memory loss. The patient does not have  insomnia and is not nervous/anxious.          Objective:   Physical Exam   Constitutional: She is oriented to person, place, and time. She appears well-developed and well-nourished.   HENT:   Head: Normocephalic and atraumatic.   Eyes: Conjunctivae and EOM are normal.   Neck: Normal range of motion. Neck supple. No JVD present.   Cardiovascular: Normal rate, regular rhythm and normal heart sounds.   No murmur heard.  Pulmonary/Chest: Effort normal and breath sounds normal. No respiratory distress. She has no wheezes. She has no rales.   Abdominal: Soft. Bowel sounds are normal. She exhibits no distension.   Musculoskeletal: Normal range of motion. She exhibits no edema.   Neurological: She is alert and oriented to person, place, and time.   Skin: Skin is warm and dry. No erythema.   Psychiatric: She has a normal mood and affect. Her behavior is normal. Judgment and thought content normal.   Nursing note and vitals reviewed.      Lab Results    Lab Results   Component Value Date     09/13/2017    K 3.4 (L) 09/13/2017     09/13/2017    CO2 24 09/13/2017    BUN 14 09/13/2017    CREATININE 0.7 09/13/2017     09/13/2017    WBC 9.45 09/13/2017    HGB 15.3 09/13/2017    HCT 46.4 09/13/2017    MCV 89 09/13/2017     09/13/2017    INR 0.9 09/12/2017    CHOL 187 09/12/2017    HDL 50 09/12/2017    LDLCALC 105.6 09/12/2017    TRIG 157 (H) 09/12/2017     (H) 09/11/2017       Lipid panel  Lab Results   Component Value Date    CHOL 187 09/12/2017     Lab Results   Component Value Date    HDL 50 09/12/2017     Lab Results   Component Value Date    LDLCALC 105.6 09/12/2017     Lab Results   Component Value Date    TRIG 157 (H) 09/12/2017       Cardiac Studies  Significant Imaging: Echocardiogram:   2D echo with color flow doppler:   Results for orders placed or performed during the hospital encounter of 09/11/17   2D echo with color flow doppler   Result Value Ref Range    QEF 60 55 - 65    Diastolic  Dysfunction Yes (A)     Mitral Valve Mobility NORMAL     Tricuspid Valve Regurgitation TRIVIAL     Narrative    Date of Procedure: 09/12/2017        TEST DESCRIPTION       Aorta: The aortic root is normal in size, measuring 3.0 cm at sinotubular junction.     Left Atrium: The left atrial volume index is normal, measuring 31.99 cc/m2.     Left Ventricle: The left ventricle is normal in size, with an end-diastolic diameter of 4.1 cm, and an end-systolic diameter of 3.0 cm. LV wall thickness is normal, with the septum measuring 1.2 cm and the posterior wall measuring 1.1 cm across. Relative   wall thickness was increased at 0.54, and the LV mass index was increased at 108.9 g/m2 consistent with concentric left ventricular hypertrophy. There are no regional wall motion abnormalities. Left ventricular systolic function appears normal. Visually   estimated ejection fraction is 60-65%. The LV Doppler derived stroke volume equals 65.0 ccs.     Diastolic indices: E wave velocity 0.8 m/s, E/A ratio 0.9,  msec., E/e' ratio(lat) 11. Mean left atrial pressures are increased. LVEDP is estimated to be 15 mmHg. There is diastolic dysfunction secondary to relaxation abnormality.     Right Atrium: The right atrium is normal in size, measuring 4.0 cm in length in the apical view.     Right Ventricle: The right ventricle is normal in size measuring 3.4 cm at the base in the apical right ventricle-focused view. Global right ventricular systolic function appears normal.     Aortic Valve:  The aortic valve is mildly sclerotic with normal leaflet mobility. The aortic valve is tri-leaflet in structure.     Mitral Valve:  The mitral valve is normal in structure with normal leaflet mobility.     Tricuspid Valve:  The tricuspid valve is normal in structure with normal leaflet mobility. There is trivial tricuspid regurgitation.     Pulmonary Valve:  The pulmonic valve is not well seen.     IVC: IVC is normal in size and collapses > 50%  with a sniff, suggesting normal right atrial pressure of 3 mmHg.     Atrial Septum: The atrial septum is intact.     Intracavitary: There is no evidence of pericardial effusion, intracavity mass, thrombi, or vegetation.         CONCLUSIONS     1 - Normal left ventricular systolic function (EF 60-65%).     2 - Impaired LV relaxation, increased LVEDP.     3 - Normal right ventricular systolic function .     4 - No wall motion abnormalities.             This document has been electronically    SIGNED BY: Yamilka Bacon MD On: 09/12/2017 14:44    and Transthoracic echo (TTE) complete (Cupid Only): No results found for this or any previous visit.  ECG:  , normal sinus rhythm, diffuse TWI  unchanged from previous tracings.    Cath study : 2017  Diagnostic:          Patient has a right dominant coronary artery.        - Left Main Coronary Artery:             The LM is normal. There is SHIRLEY 3 flow.     - Left Anterior Descending Artery:             The proximal LAD has a 40% stenosis. There is SHIRLEY 3 flow. The remaining portion of the vessel has luminal irregularities (10).     - D1:             The D1 has luminal irregularities. There is SHIRLEY 3 flow.     - Septal:             The septal has luminal irregularities. There is SHIRLEY 3 flow.     - Left Circumflex Artery:             The mid LCX has a 40% stenosis. There is SHIRLEY 3 flow. The remaining portion of the vessel has luminal irregularities (10).     - Ramus:             The ramus has luminal irregularities. There is SHIRLEY 3 flow.     - Right Coronary Artery:             The proximal RCA has a 30% stenosis. There is SHIRLEY 3 flow.             The distal RCA has a 95% stenosis. There is SHIRLEY 3 flow.                     Lesion Details:   The lesion is ulcerated.     - Posterior Descending Artery:             The ostial PDA has a 30% stenosis. There is SHIRLEY 3 flow. The remaining portion of the vessel has luminal irregularities (10).     - Posterior Lateral Branch:              The PLB has luminal irregularities. There is SHIRLEY 3 flow.      Intervention          Distal RCA:              The lesion was successfully intervened. Post-stenosis of 0% and post-SHIRLEY 3 flow. The vessel was accessed natively.  The following items were used: Blln Trek Rx 2.5 X 15 and Stent Resolute Rx 4.0 X 26 (S[facility]).     Assessment:     1. Coronary artery disease involving native coronary artery of native heart without angina pectoris    2. Hypertensive urgency    3. Malignant hypertensive urgency    4. Tobacco abuse        Plan:     1. Malignant HTN  - symptomatic with BP controled and HTN medication. Likely getting a lot of symptoms of vasospasm and poor cerebral perfusion with drop of SBP.  - states she is semi-compliant with her home medications  - intolerant to spirolactone and minoxdil but prefers spirolactone vs minoxdil  - C/w norvasc, coreg, losartan  -c/w hydralazine, will increase to 1000mg BID.  - will go back on spirolactone 12.5  - tried to enroll pt in digital HTN clinic but pt didn't like the Rn and now refuses HTN clinic  - advise pt has to tolerate the medication dizziness or risk massive CVA/MI. No other longterm choices here  - when save bp controll, will need secondary HTN work up    2. CAD with RCA stent  - stable. C/w asa/plavix/statin    3. Tobacco use  - spent 3-10 minutes discussing the risk and benefits of smoking cessation. Patient is not ready to quit at this time.  - Ready to quit: No    Counseling given: Yes    Continue with current medical plan and lifestyle changes.  Return sooner for concerns or questions. If symptoms persist go to the ED  Total duration of face to face visit time 30 minutes.  Total time spent counseling greater than fifty percent of total visit time.  Counseling included discussion regarding imaging findings, diagnosis, possibilities, treatment options, risks and benefits.      No orders of the defined types were placed in this encounter.      Follow up as  scheduled. Return to clinic in 4-6 weeks, Bp check   She expressed verbal understanding and agreed with the plan    Thank you for the opportunity to care for this patient. Will be available for questions if needed.     Vivek Benitez MD [facility]  Interventional Cardiology  [facility]  Pager: [phone]

## 2020-12-08 ENCOUNTER — CLINICAL SUPPORT (OUTPATIENT)
Dept: CARDIOLOGY | Facility: CLINIC | Age: 60
End: 2020-12-08
Attending: INTERNAL MEDICINE
Payer: MEDICAID

## 2020-12-08 ENCOUNTER — OUTSIDE PLACE OF SERVICE (OUTPATIENT)
Dept: CARDIOLOGY | Facility: CLINIC | Age: 60
End: 2020-12-08
Payer: MEDICAID

## 2020-12-08 DIAGNOSIS — G45.9 TIA (TRANSIENT ISCHEMIC ATTACK): ICD-10-CM

## 2020-12-08 PROCEDURE — 93306 ECHO (CUPID ONLY): ICD-10-PCS | Mod: 26,,, | Performed by: INTERNAL MEDICINE

## 2020-12-08 PROCEDURE — 93306 TTE W/DOPPLER COMPLETE: CPT | Mod: 26,,, | Performed by: INTERNAL MEDICINE

## 2020-12-08 PROCEDURE — 93010 ELECTROCARDIOGRAM REPORT: CPT | Mod: ,,, | Performed by: INTERNAL MEDICINE

## 2020-12-08 PROCEDURE — 93010 PR ELECTROCARDIOGRAM REPORT: ICD-10-PCS | Mod: ,,, | Performed by: INTERNAL MEDICINE

## 2020-12-09 LAB
AORTIC VALVE CUSP SEPERATION: 2 CM
ASCENDING AORTA: 2.6 CM
AV INDEX (PROSTH): 0.63
AV MEAN GRADIENT: 7 MMHG
AV PEAK GRADIENT: 12 MMHG
AV VALVE AREA: 1.79 CM2
AV VELOCITY RATIO: 0.59
CV ECHO LV RWT: 0.35 CM
DOP CALC AO PEAK VEL: 1.7 M/S
DOP CALC AO VTI: 38.7 CM
DOP CALC LVOT AREA: 2.8 CM2
DOP CALC LVOT DIAMETER: 1.9 CM
DOP CALC LVOT PEAK VEL: 1 M/S
DOP CALC LVOT STROKE VOLUME: 69.15 CM3
DOP CALC MV VTI: 32 CM
DOP CALCLVOT PEAK VEL VTI: 24.4 CM
E WAVE DECELERATION TIME: 237 MSEC
E/A RATIO: 0.55
E/E' RATIO: 14.4 M/S
ECHO LV POSTERIOR WALL: 0.9 CM (ref 0.6–1.1)
FRACTIONAL SHORTENING: 43 % (ref 28–44)
HR TR ECHO: 59 BPM
INTERVENTRICULAR SEPTUM: 1.2 CM (ref 0.6–1.1)
IVC PROX: 1.3 CM
IVRT: 173 MSEC
LA MAJOR: 4.9 CM
LA MINOR: 3.3 CM
LA WIDTH: 3.7 CM
LEFT ATRIUM SIZE: 3.7 CM
LEFT ATRIUM VOLUME: 45.89 CM3
LEFT INTERNAL DIMENSION IN SYSTOLE: 2.9 CM (ref 2.1–4)
LEFT VENTRICULAR INTERNAL DIMENSION IN DIASTOLE: 5.1 CM (ref 3.5–6)
LEFT VENTRICULAR MASS: 200.78 G
LV LATERAL E/E' RATIO: 12 M/S
LV SEPTAL E/E' RATIO: 18 M/S
MV A" WAVE DURATION": 111 MSEC
MV MEAN GRADIENT: 2 MMHG
MV PEAK A VEL: 1.32 M/S
MV PEAK E VEL: 0.72 M/S
MV PEAK GRADIENT: 7 MMHG
MV STENOSIS PRESSURE HALF TIME: 78 MS
MV VALVE AREA BY CONTINUITY EQUATION: 2.16 CM2
MV VALVE AREA P 1/2 METHOD: 2.82 CM2
PISA TR MAX VEL: 1.93 M/S
PULM VEIN A" WAVE DURATION": 111 MSEC
PULM VEIN S/D RATIO: 1.68
PV PEAK D VEL: 0.41 M/S
PV PEAK S VEL: 0.69 M/S
PV PEAK VELOCITY: 0.75 CM/S
RA MAJOR: 4.9 CM
RA PRESSURE: 3 MMHG
RA WIDTH: 3.1 CM
RIGHT VENTRICULAR END-DIASTOLIC DIMENSION: 2.2 CM
SINUS: 3 CM
STJ: 2.2 CM
TDI LATERAL: 0.06 M/S
TDI SEPTAL: 0.04 M/S
TDI: 0.05 M/S
TR MAX PG: 15 MMHG
TRICUSPID ANNULAR PLANE SYSTOLIC EXCURSION: 2.1 CM
TV REST PULMONARY ARTERY PRESSURE: 18 MMHG